# Patient Record
Sex: FEMALE | Race: WHITE | NOT HISPANIC OR LATINO | Employment: UNEMPLOYED | URBAN - NONMETROPOLITAN AREA
[De-identification: names, ages, dates, MRNs, and addresses within clinical notes are randomized per-mention and may not be internally consistent; named-entity substitution may affect disease eponyms.]

---

## 2022-04-19 PROBLEM — M12.9 ARTHROPATHY: Status: ACTIVE | Noted: 2022-04-19

## 2022-04-19 PROBLEM — E03.9 HYPOTHYROIDISM: Status: ACTIVE | Noted: 2022-04-19

## 2022-04-19 PROBLEM — E11.9 TYPE 2 OR UNSPECIFIED TYPE DIABETES MELLITUS: Status: ACTIVE | Noted: 2022-04-19

## 2022-04-19 PROBLEM — E78.2 HYPERLIPIDEMIA, MIXED: Status: ACTIVE | Noted: 2022-04-19

## 2022-04-19 PROBLEM — Z00.00 WELLNESS EXAMINATION: Status: ACTIVE | Noted: 2022-04-19

## 2022-04-19 PROBLEM — E55.9 VITAMIN D DEFICIENCY: Status: ACTIVE | Noted: 2022-04-19

## 2022-04-19 PROBLEM — I10 BENIGN ESSENTIAL HYPERTENSION: Status: ACTIVE | Noted: 2022-04-19

## 2022-04-19 PROBLEM — F33.9 DEPRESSION, MAJOR, RECURRENT: Status: ACTIVE | Noted: 2022-04-19

## 2022-07-25 PROBLEM — Z00.00 WELLNESS EXAMINATION: Status: RESOLVED | Noted: 2022-04-19 | Resolved: 2022-07-25

## 2022-10-27 ENCOUNTER — LAB VISIT (OUTPATIENT)
Dept: LAB | Facility: HOSPITAL | Age: 87
End: 2022-10-27
Attending: INTERNAL MEDICINE
Payer: MEDICARE

## 2022-10-27 DIAGNOSIS — E03.9 HYPOTHYROIDISM, UNSPECIFIED TYPE: ICD-10-CM

## 2022-10-27 DIAGNOSIS — I10 BENIGN ESSENTIAL HYPERTENSION: ICD-10-CM

## 2022-10-27 DIAGNOSIS — E78.2 HYPERLIPIDEMIA, MIXED: ICD-10-CM

## 2022-10-27 DIAGNOSIS — E55.9 VITAMIN D DEFICIENCY: ICD-10-CM

## 2022-10-27 LAB
25(OH)D3+25(OH)D2 SERPL-MCNC: 59 NG/ML (ref 30–96)
ALBUMIN SERPL BCP-MCNC: 3.1 G/DL (ref 3.5–5.2)
ALP SERPL-CCNC: 83 U/L (ref 55–135)
ALT SERPL W/O P-5'-P-CCNC: 20 U/L (ref 10–44)
ANION GAP SERPL CALC-SCNC: 7 MMOL/L (ref 8–16)
AST SERPL-CCNC: 16 U/L (ref 10–40)
BASOPHILS # BLD AUTO: 0.03 K/UL (ref 0–0.2)
BASOPHILS NFR BLD: 0.4 % (ref 0–1.9)
BILIRUB SERPL-MCNC: 0.3 MG/DL (ref 0.1–1)
BUN SERPL-MCNC: 12 MG/DL (ref 8–23)
CALCIUM SERPL-MCNC: 8.5 MG/DL (ref 8.7–10.5)
CHLORIDE SERPL-SCNC: 107 MMOL/L (ref 95–110)
CHOLEST SERPL-MCNC: 124 MG/DL (ref 120–199)
CHOLEST/HDLC SERPL: 3.3 {RATIO} (ref 2–5)
CO2 SERPL-SCNC: 29 MMOL/L (ref 23–29)
CREAT SERPL-MCNC: 0.8 MG/DL (ref 0.5–1.4)
DIFFERENTIAL METHOD: ABNORMAL
EOSINOPHIL # BLD AUTO: 0.1 K/UL (ref 0–0.5)
EOSINOPHIL NFR BLD: 1.3 % (ref 0–8)
ERYTHROCYTE [DISTWIDTH] IN BLOOD BY AUTOMATED COUNT: 14.7 % (ref 11.5–14.5)
EST. GFR  (NO RACE VARIABLE): >60 ML/MIN/1.73 M^2
GLUCOSE SERPL-MCNC: 136 MG/DL (ref 70–110)
HCT VFR BLD AUTO: 41.1 % (ref 37–48.5)
HDLC SERPL-MCNC: 38 MG/DL (ref 40–75)
HDLC SERPL: 30.6 % (ref 20–50)
HGB BLD-MCNC: 13.1 G/DL (ref 12–16)
IMM GRANULOCYTES # BLD AUTO: 0.02 K/UL (ref 0–0.04)
IMM GRANULOCYTES NFR BLD AUTO: 0.3 % (ref 0–0.5)
LDLC SERPL CALC-MCNC: 21.2 MG/DL (ref 63–159)
LYMPHOCYTES # BLD AUTO: 1.2 K/UL (ref 1–4.8)
LYMPHOCYTES NFR BLD: 17.7 % (ref 18–48)
MCH RBC QN AUTO: 30.1 PG (ref 27–31)
MCHC RBC AUTO-ENTMCNC: 31.9 G/DL (ref 32–36)
MCV RBC AUTO: 95 FL (ref 82–98)
MONOCYTES # BLD AUTO: 0.4 K/UL (ref 0.3–1)
MONOCYTES NFR BLD: 6.3 % (ref 4–15)
NEUTROPHILS # BLD AUTO: 5 K/UL (ref 1.8–7.7)
NEUTROPHILS NFR BLD: 74 % (ref 38–73)
NONHDLC SERPL-MCNC: 86 MG/DL
NRBC BLD-RTO: 0 /100 WBC
PLATELET # BLD AUTO: 146 K/UL (ref 150–450)
PMV BLD AUTO: 12 FL (ref 9.2–12.9)
POTASSIUM SERPL-SCNC: 3.6 MMOL/L (ref 3.5–5.1)
PROT SERPL-MCNC: 7 G/DL (ref 6–8.4)
RBC # BLD AUTO: 4.35 M/UL (ref 4–5.4)
SODIUM SERPL-SCNC: 143 MMOL/L (ref 136–145)
TRIGL SERPL-MCNC: 324 MG/DL (ref 30–150)
TSH SERPL DL<=0.005 MIU/L-ACNC: 2.12 UIU/ML (ref 0.4–4)
WBC # BLD AUTO: 6.72 K/UL (ref 3.9–12.7)

## 2022-10-27 PROCEDURE — 36415 COLL VENOUS BLD VENIPUNCTURE: CPT | Performed by: INTERNAL MEDICINE

## 2022-10-27 PROCEDURE — 85025 COMPLETE CBC W/AUTO DIFF WBC: CPT | Performed by: INTERNAL MEDICINE

## 2022-10-27 PROCEDURE — 82306 VITAMIN D 25 HYDROXY: CPT | Performed by: INTERNAL MEDICINE

## 2022-10-27 PROCEDURE — 84443 ASSAY THYROID STIM HORMONE: CPT | Performed by: INTERNAL MEDICINE

## 2022-10-27 PROCEDURE — 80061 LIPID PANEL: CPT | Performed by: INTERNAL MEDICINE

## 2022-10-27 PROCEDURE — 80053 COMPREHEN METABOLIC PANEL: CPT | Performed by: INTERNAL MEDICINE

## 2023-11-11 ENCOUNTER — HOSPITAL ENCOUNTER (INPATIENT)
Facility: HOSPITAL | Age: 88
LOS: 5 days | Discharge: HOSPICE | DRG: 690 | End: 2023-11-16
Attending: EMERGENCY MEDICINE | Admitting: INTERNAL MEDICINE
Payer: MEDICARE

## 2023-11-11 DIAGNOSIS — Z51.5 HOSPICE CARE PATIENT: ICD-10-CM

## 2023-11-11 DIAGNOSIS — N30.00 ACUTE CYSTITIS WITHOUT HEMATURIA: Primary | ICD-10-CM

## 2023-11-11 DIAGNOSIS — F03.90 DEMENTIA: ICD-10-CM

## 2023-11-11 LAB
ALBUMIN SERPL BCP-MCNC: 2.6 G/DL (ref 3.5–5.2)
ALP SERPL-CCNC: 103 U/L (ref 55–135)
ALT SERPL W/O P-5'-P-CCNC: 20 U/L (ref 10–44)
ANION GAP SERPL CALC-SCNC: 1 MMOL/L (ref 3–11)
AST SERPL-CCNC: 18 U/L (ref 10–40)
BACTERIA #/AREA URNS HPF: ABNORMAL /HPF
BASOPHILS # BLD AUTO: 0.04 K/UL (ref 0–0.2)
BASOPHILS NFR BLD: 0.5 % (ref 0–1.9)
BILIRUB SERPL-MCNC: 0.5 MG/DL (ref 0.1–1)
BILIRUB UR QL STRIP: NEGATIVE
BUN SERPL-MCNC: 12 MG/DL (ref 10–30)
CALCIUM SERPL-MCNC: 8.6 MG/DL (ref 8.7–10.5)
CHLORIDE SERPL-SCNC: 104 MMOL/L (ref 95–110)
CLARITY UR: ABNORMAL
CO2 SERPL-SCNC: 32 MMOL/L (ref 23–29)
COLOR UR: YELLOW
CREAT SERPL-MCNC: 0.8 MG/DL (ref 0.5–1.4)
DIFFERENTIAL METHOD: ABNORMAL
EOSINOPHIL # BLD AUTO: 0.1 K/UL (ref 0–0.5)
EOSINOPHIL NFR BLD: 0.9 % (ref 0–8)
ERYTHROCYTE [DISTWIDTH] IN BLOOD BY AUTOMATED COUNT: 14.6 % (ref 11.5–14.5)
EST. GFR  (NO RACE VARIABLE): >60 ML/MIN/1.73 M^2
ESTIMATED AVG GLUCOSE: 91 MG/DL (ref 68–131)
ESTIMATED AVG GLUCOSE: 91 MG/DL (ref 68–131)
FIO2: 28 %
GLUCOSE SERPL-MCNC: 68 MG/DL (ref 70–110)
GLUCOSE UR QL STRIP: NEGATIVE
HBA1C MFR BLD: 4.8 % (ref 4–5.6)
HBA1C MFR BLD: 4.8 % (ref 4–5.6)
HCT VFR BLD AUTO: 33.8 % (ref 37–48.5)
HGB BLD-MCNC: 11 G/DL (ref 12–16)
HGB UR QL STRIP: NEGATIVE
HYALINE CASTS #/AREA URNS LPF: 1 /LPF
IMM GRANULOCYTES # BLD AUTO: 0.03 K/UL (ref 0–0.04)
IMM GRANULOCYTES NFR BLD AUTO: 0.3 % (ref 0–0.5)
INFLUENZA A, MOLECULAR: NEGATIVE
INFLUENZA B, MOLECULAR: NEGATIVE
KETONES UR QL STRIP: ABNORMAL
LACTATE SERPL-SCNC: 0.8 MMOL/L (ref 0.5–2.2)
LEUKOCYTE ESTERASE UR QL STRIP: ABNORMAL
LPM: 2
LYMPHOCYTES # BLD AUTO: 0.7 K/UL (ref 1–4.8)
LYMPHOCYTES NFR BLD: 8.2 % (ref 18–48)
MCH RBC QN AUTO: 30.8 PG (ref 27–31)
MCHC RBC AUTO-ENTMCNC: 32.5 G/DL (ref 32–36)
MCV RBC AUTO: 95 FL (ref 82–98)
MICROSCOPIC COMMENT: ABNORMAL
MODIFIED ALLEN'S TEST: POSITIVE
MONOCYTES # BLD AUTO: 0.7 K/UL (ref 0.3–1)
MONOCYTES NFR BLD: 7.9 % (ref 4–15)
NEUTROPHILS # BLD AUTO: 7.2 K/UL (ref 1.8–7.7)
NEUTROPHILS NFR BLD: 82.2 % (ref 38–73)
NITRITE UR QL STRIP: NEGATIVE
NRBC BLD-RTO: 0 /100 WBC
O2DEVICE: ABNORMAL
PCO2 BLDA: 35.9 MMHG (ref 35–45)
PH SMN: 7.49 [PH] (ref 7.34–7.45)
PH UR STRIP: 7 [PH] (ref 5–8)
PLATELET # BLD AUTO: 219 K/UL (ref 150–450)
PMV BLD AUTO: 10.4 FL (ref 9.2–12.9)
PO2 BLDA: 74.5 MMHG (ref 80–100)
POC BASE DEFICIT: 3.9 MMOL/L (ref -2–2)
POC HCO3: 27.8 MMOL/L (ref 24–28)
POC PERFORMED BY: ABNORMAL
POC SATURATED O2: 97.5 % (ref 95–100)
POC TEMPERATURE: 37 C
POTASSIUM SERPL-SCNC: 3.5 MMOL/L (ref 3.5–5.1)
PROT SERPL-MCNC: 6.8 G/DL (ref 6–8.4)
PROT UR QL STRIP: ABNORMAL
RBC # BLD AUTO: 3.57 M/UL (ref 4–5.4)
RBC #/AREA URNS HPF: 3 /HPF (ref 0–4)
RSV AG SPEC QL IA: NEGATIVE
SARS-COV-2 RNA RESP QL NAA+PROBE: NOT DETECTED
SODIUM SERPL-SCNC: 137 MMOL/L (ref 136–145)
SP GR UR STRIP: 1.02 (ref 1–1.03)
SPECIMEN SOURCE: ABNORMAL
SPECIMEN SOURCE: NORMAL
SPECIMEN SOURCE: NORMAL
SQUAMOUS #/AREA URNS HPF: 1 /HPF
URN SPEC COLLECT METH UR: ABNORMAL
UROBILINOGEN UR STRIP-ACNC: ABNORMAL EU/DL
WBC # BLD AUTO: 8.78 K/UL (ref 3.9–12.7)
WBC #/AREA URNS HPF: >100 /HPF (ref 0–5)

## 2023-11-11 PROCEDURE — 87040 BLOOD CULTURE FOR BACTERIA: CPT | Performed by: EMERGENCY MEDICINE

## 2023-11-11 PROCEDURE — 25000003 PHARM REV CODE 250: Performed by: EMERGENCY MEDICINE

## 2023-11-11 PROCEDURE — 87502 INFLUENZA DNA AMP PROBE: CPT | Performed by: EMERGENCY MEDICINE

## 2023-11-11 PROCEDURE — 36415 COLL VENOUS BLD VENIPUNCTURE: CPT | Performed by: EMERGENCY MEDICINE

## 2023-11-11 PROCEDURE — 83605 ASSAY OF LACTIC ACID: CPT | Performed by: EMERGENCY MEDICINE

## 2023-11-11 PROCEDURE — 93005 ELECTROCARDIOGRAM TRACING: CPT

## 2023-11-11 PROCEDURE — 87077 CULTURE AEROBIC IDENTIFY: CPT | Performed by: EMERGENCY MEDICINE

## 2023-11-11 PROCEDURE — 87086 URINE CULTURE/COLONY COUNT: CPT | Performed by: EMERGENCY MEDICINE

## 2023-11-11 PROCEDURE — 87634 RSV DNA/RNA AMP PROBE: CPT | Performed by: EMERGENCY MEDICINE

## 2023-11-11 PROCEDURE — 87088 URINE BACTERIA CULTURE: CPT | Performed by: EMERGENCY MEDICINE

## 2023-11-11 PROCEDURE — 83036 HEMOGLOBIN GLYCOSYLATED A1C: CPT | Performed by: EMERGENCY MEDICINE

## 2023-11-11 PROCEDURE — 85025 COMPLETE CBC W/AUTO DIFF WBC: CPT | Performed by: EMERGENCY MEDICINE

## 2023-11-11 PROCEDURE — 99285 EMERGENCY DEPT VISIT HI MDM: CPT | Mod: 25

## 2023-11-11 PROCEDURE — 87635 SARS-COV-2 COVID-19 AMP PRB: CPT | Performed by: EMERGENCY MEDICINE

## 2023-11-11 PROCEDURE — 36600 WITHDRAWAL OF ARTERIAL BLOOD: CPT

## 2023-11-11 PROCEDURE — 82803 BLOOD GASES ANY COMBINATION: CPT

## 2023-11-11 PROCEDURE — 80053 COMPREHEN METABOLIC PANEL: CPT | Performed by: EMERGENCY MEDICINE

## 2023-11-11 PROCEDURE — 63600175 PHARM REV CODE 636 W HCPCS: Performed by: EMERGENCY MEDICINE

## 2023-11-11 PROCEDURE — 99900035 HC TECH TIME PER 15 MIN (STAT)

## 2023-11-11 PROCEDURE — 96360 HYDRATION IV INFUSION INIT: CPT

## 2023-11-11 PROCEDURE — 93010 ELECTROCARDIOGRAM REPORT: CPT | Mod: GW,,, | Performed by: INTERNAL MEDICINE

## 2023-11-11 PROCEDURE — 93010 EKG 12-LEAD: ICD-10-PCS | Mod: GW,,, | Performed by: INTERNAL MEDICINE

## 2023-11-11 PROCEDURE — 81000 URINALYSIS NONAUTO W/SCOPE: CPT | Performed by: EMERGENCY MEDICINE

## 2023-11-11 PROCEDURE — 11000001 HC ACUTE MED/SURG PRIVATE ROOM

## 2023-11-11 RX ORDER — LEVOTHYROXINE SODIUM 75 UG/1
75 TABLET ORAL DAILY
Status: DISCONTINUED | OUTPATIENT
Start: 2023-11-12 | End: 2023-11-16 | Stop reason: HOSPADM

## 2023-11-11 RX ORDER — MEMANTINE HYDROCHLORIDE 10 MG/1
10 TABLET ORAL 2 TIMES DAILY
Status: DISCONTINUED | OUTPATIENT
Start: 2023-11-11 | End: 2023-11-16 | Stop reason: HOSPADM

## 2023-11-11 RX ORDER — FAMOTIDINE 10 MG/ML
20 INJECTION INTRAVENOUS DAILY
Status: DISCONTINUED | OUTPATIENT
Start: 2023-11-12 | End: 2023-11-14

## 2023-11-11 RX ORDER — GLUCAGON 1 MG
1 KIT INJECTION
Status: DISCONTINUED | OUTPATIENT
Start: 2023-11-11 | End: 2023-11-16 | Stop reason: HOSPADM

## 2023-11-11 RX ORDER — IBUPROFEN 200 MG
24 TABLET ORAL
Status: DISCONTINUED | OUTPATIENT
Start: 2023-11-11 | End: 2023-11-16 | Stop reason: HOSPADM

## 2023-11-11 RX ORDER — TALC
6 POWDER (GRAM) TOPICAL NIGHTLY PRN
Status: DISCONTINUED | OUTPATIENT
Start: 2023-11-11 | End: 2023-11-16 | Stop reason: HOSPADM

## 2023-11-11 RX ORDER — IBUPROFEN 200 MG
16 TABLET ORAL
Status: DISCONTINUED | OUTPATIENT
Start: 2023-11-11 | End: 2023-11-16 | Stop reason: HOSPADM

## 2023-11-11 RX ORDER — SODIUM CHLORIDE 9 MG/ML
INJECTION, SOLUTION INTRAVENOUS CONTINUOUS
Status: DISCONTINUED | OUTPATIENT
Start: 2023-11-11 | End: 2023-11-13

## 2023-11-11 RX ORDER — INSULIN ASPART 100 [IU]/ML
0-5 INJECTION, SOLUTION INTRAVENOUS; SUBCUTANEOUS
Status: DISCONTINUED | OUTPATIENT
Start: 2023-11-11 | End: 2023-11-16 | Stop reason: HOSPADM

## 2023-11-11 RX ORDER — ACETAMINOPHEN 325 MG/1
650 TABLET ORAL EVERY 8 HOURS PRN
Status: DISCONTINUED | OUTPATIENT
Start: 2023-11-11 | End: 2023-11-16 | Stop reason: HOSPADM

## 2023-11-11 RX ORDER — SODIUM CHLORIDE 0.9 % (FLUSH) 0.9 %
10 SYRINGE (ML) INJECTION
Status: DISCONTINUED | OUTPATIENT
Start: 2023-11-11 | End: 2023-11-16 | Stop reason: HOSPADM

## 2023-11-11 RX ORDER — ONDANSETRON 2 MG/ML
4 INJECTION INTRAMUSCULAR; INTRAVENOUS EVERY 8 HOURS PRN
Status: DISCONTINUED | OUTPATIENT
Start: 2023-11-11 | End: 2023-11-16 | Stop reason: HOSPADM

## 2023-11-11 RX ADMIN — CEFTRIAXONE 2 G: 2 INJECTION, POWDER, FOR SOLUTION INTRAMUSCULAR; INTRAVENOUS at 03:11

## 2023-11-11 RX ADMIN — SODIUM CHLORIDE 1000 ML: 9 INJECTION, SOLUTION INTRAVENOUS at 01:11

## 2023-11-11 RX ADMIN — SODIUM CHLORIDE: 9 INJECTION, SOLUTION INTRAVENOUS at 05:11

## 2023-11-11 NOTE — ED NOTES
ROBIN Montes talked with ROBIN Lees from hospice. ROBIN Lees was unsure of patients normal state. Informed Yoana of pt currently state and vitals.

## 2023-11-11 NOTE — ED PROVIDER NOTES
"Encounter Date: 11/11/2023       History     Chief Complaint   Patient presents with    Fatigue     EMS reports, "Hospice patient for dementia. The family had some difficulty waking her up today; they said she wasn't waking up like normal. Takes Seroquel at night. They want her evaluated for a stroke. CBG 72. Patient is able to answer questions."     89-year-old female with a history of dementia, on hospice, diabetes, hypertension presents the emergency room after the family tried to wake her up and she was more drowsy than normal.  She does take Seroquel.  No focal deficits, patient just appears tired.  No trauma.      Review of patient's allergies indicates:   Allergen Reactions    Sulfamethoxazole-trimethoprim Hives     Other reaction(s): hives     Past Medical History:   Diagnosis Date    Anemia, unspecified     Anxiety disorder, unspecified     Asthma     Carpal tunnel syndrome     Chills     Constipation     Dementia     Depression     Dermatosis     Diabetes mellitus out of control     Diabetes mellitus type 2, controlled     Diarrhea     Dizziness     Gastroenteritis     Gastroesophageal reflux     GI hemorrhage     Heartburn     HTN (hypertension)     Hypothyroidism, unspecified     Impaction of the bowels     Irritable bowel syndrome     Major depressive disorder, single episode, unspecified     Malaise and fatigue     Melena     Mild intermittent asthma, uncomplicated     Mixed hyperlipidemia     Nausea with vomiting     Osteopenia     Rheumatoid arthritis, unspecified     Rosacea     Sleep apnea, unspecified     Unspecified macular degeneration     Unspecified osteoarthritis, unspecified site     Vitamin D deficiency     Weakness      Past Surgical History:   Procedure Laterality Date    CARPAL TUNNEL RELEASE  2006    lt. hand    CATARACT EXTRACTION  2006    CHOLECYSTECTOMY      COLONOSCOPY  1997    COSMETIC SURGERY      FACE LIFT    HEMORRHOID SURGERY      RHINOPLASTY       Family History   Problem " Relation Age of Onset    Coronary artery disease Father     Coronary artery disease Brother     Diabetes Mellitus Brother     Diabetes Mellitus Paternal Grandmother      Social History     Tobacco Use    Smoking status: Former     Current packs/day: 1.00     Types: Cigarettes    Smokeless tobacco: Never    Tobacco comments:     started age 18 stopped age 55   Substance Use Topics    Alcohol use: Not Currently    Drug use: Never     Review of Systems   HENT:  Negative for congestion.    Eyes:  Negative for discharge.   Respiratory:  Negative for cough.    Cardiovascular:  Negative for chest pain and leg swelling.   Gastrointestinal:  Negative for abdominal distention.   All other systems reviewed and are negative.      Physical Exam     Initial Vitals [11/11/23 1315]   BP Pulse Resp Temp SpO2   (!) 149/65 87 18 99.4 °F (37.4 °C) 95 %      MAP       --         Physical Exam    Nursing note and vitals reviewed.  Constitutional: She appears well-developed and well-nourished. She is not diaphoretic. No distress.   HENT:   Head: Normocephalic and atraumatic.   Nose: Nose normal.   Mouth/Throat: Oropharynx is clear and moist.   Eyes: Conjunctivae and EOM are normal. Pupils are equal, round, and reactive to light.   Neck: Neck supple.   Normal range of motion.  Cardiovascular:  Normal rate, regular rhythm, normal heart sounds and intact distal pulses.           Pulmonary/Chest: Breath sounds normal. No respiratory distress. She has no wheezes. She has no rhonchi. She has no rales.   Abdominal: Abdomen is soft. Bowel sounds are normal. She exhibits no distension. There is no abdominal tenderness. There is no rebound and no guarding.   Musculoskeletal:         General: No edema. Normal range of motion.      Cervical back: Normal range of motion and neck supple.     Neurological: She is alert.   Patient does answer questions, follows some commands.  She appears drowsy, no focal deficits noted, no facial droop   Skin: Skin is  warm and dry. Capillary refill takes less than 2 seconds.         ED Course   Procedures  Labs Reviewed   CBC W/ AUTO DIFFERENTIAL - Abnormal; Notable for the following components:       Result Value    RBC 3.57 (*)     Hemoglobin 11.0 (*)     Hematocrit 33.8 (*)     RDW 14.6 (*)     Lymph # 0.7 (*)     Gran % 82.2 (*)     Lymph % 8.2 (*)     All other components within normal limits   COMPREHENSIVE METABOLIC PANEL - Abnormal; Notable for the following components:    CO2 32 (*)     Glucose 68 (*)     Calcium 8.6 (*)     Albumin 2.6 (*)     Anion Gap 1 (*)     All other components within normal limits   URINALYSIS, REFLEX TO URINE CULTURE - Abnormal; Notable for the following components:    Appearance, UA Cloudy (*)     Protein, UA 1+ (*)     Ketones, UA 1+ (*)     Urobilinogen, UA 2.0-3.0 (*)     Leukocytes, UA 2+ (*)     All other components within normal limits    Narrative:     Preferred Collection Type->Urine, Clean Catch  Specimen Source->Urine   URINALYSIS MICROSCOPIC - Abnormal; Notable for the following components:    WBC, UA >100 (*)     Bacteria Few (*)     All other components within normal limits    Narrative:     Preferred Collection Type->Urine, Clean Catch  Specimen Source->Urine   INFLUENZA A & B BY MOLECULAR   CULTURE, BLOOD   CULTURE, BLOOD   CULTURE, URINE   LACTIC ACID, PLASMA   RSV ANTIGEN DETECTION   SARS-COV-2 (COVID-19) QUALITATIVE PCR     EKG Readings: (Independently Interpreted)   Initial Reading: No STEMI. Rhythm: Normal Sinus Rhythm. Heart Rate: 88. Ectopy: No Ectopy. Conduction: Normal. ST Segments: Normal ST Segments. T Waves: Normal. Axis: Normal. Clinical Impression: Normal Sinus Rhythm       Imaging Results              X-Ray Chest 1 View (Final result)  Result time 11/11/23 13:45:51      Final result by Prisca Bradshaw MD (11/11/23 13:45:51)                   Impression:      Diffuse chronic interstitial changes.      Electronically signed by: Prisca Bradshaw  MD  Date:    11/11/2023  Time:    13:45               Narrative:    EXAMINATION:  XR CHEST 1 VIEW    CLINICAL HISTORY:  Unspecified dementia, unspecified severity, without behavioral disturbance, psychotic disturbance, mood disturbance, and anxiety    TECHNIQUE:  portable chest x-ray    COMPARISON:  03/28/2015.  <Comparisons>    FINDINGS:  Chronic changes.  No focal infiltrates or effusions                                       Medications   cefTRIAXone (ROCEPHIN) 2 g in dextrose 5 % in water (D5W) 100 mL IVPB (MB+) (has no administration in time range)   sodium chloride 0.9% bolus 1,000 mL 1,000 mL (1,000 mLs Intravenous New Bag 11/11/23 1348)     Medical Decision Making  Amount and/or Complexity of Data Reviewed  Labs: ordered.  Radiology: ordered.               ED Course as of 11/11/23 1509   Sat Nov 11, 2023   1336 Chest x-ray is negative for acute changes [SD]      ED Course User Index  [SD] Vini Kerr MD               Medical Decision Making:   Differential Diagnosis:   Progressive dementia, affects of Seroquel, UTI, viral syndrome  ED Management:  Discussed case with  - will admit for IV fluids and IV antibiotics, neuro checks.    No clinical signs of sepsis, blood pressure is good, not tachycardic, white blood cell count is normal.  Stable for admission to telemetry floor      Clinical Impression:   Final diagnoses:  [F03.90] Dementia  [N30.00] Acute cystitis without hematuria (Primary)        ED Disposition Condition    Admit Stable                Vini Kerr MD  11/11/23 3486

## 2023-11-12 PROBLEM — N30.00 ACUTE CYSTITIS WITHOUT HEMATURIA: Status: ACTIVE | Noted: 2023-11-12

## 2023-11-12 LAB
ALBUMIN SERPL BCP-MCNC: 2.2 G/DL (ref 3.5–5.2)
ALP SERPL-CCNC: 93 U/L (ref 55–135)
ALT SERPL W/O P-5'-P-CCNC: 17 U/L (ref 10–44)
ANION GAP SERPL CALC-SCNC: 7 MMOL/L (ref 3–11)
AST SERPL-CCNC: 34 U/L (ref 10–40)
BASOPHILS # BLD AUTO: 0.02 K/UL (ref 0–0.2)
BASOPHILS NFR BLD: 0.2 % (ref 0–1.9)
BILIRUB SERPL-MCNC: 0.3 MG/DL (ref 0.1–1)
BUN SERPL-MCNC: 13 MG/DL (ref 10–30)
CALCIUM SERPL-MCNC: 7.9 MG/DL (ref 8.7–10.5)
CHLORIDE SERPL-SCNC: 108 MMOL/L (ref 95–110)
CO2 SERPL-SCNC: 23 MMOL/L (ref 23–29)
CREAT SERPL-MCNC: 0.8 MG/DL (ref 0.5–1.4)
DIFFERENTIAL METHOD: ABNORMAL
EOSINOPHIL # BLD AUTO: 0 K/UL (ref 0–0.5)
EOSINOPHIL NFR BLD: 0 % (ref 0–8)
ERYTHROCYTE [DISTWIDTH] IN BLOOD BY AUTOMATED COUNT: 14.6 % (ref 11.5–14.5)
EST. GFR  (NO RACE VARIABLE): >60 ML/MIN/1.73 M^2
GLUCOSE SERPL-MCNC: 153 MG/DL (ref 70–110)
HCT VFR BLD AUTO: 31.3 % (ref 37–48.5)
HGB BLD-MCNC: 10.4 G/DL (ref 12–16)
IMM GRANULOCYTES # BLD AUTO: 0.05 K/UL (ref 0–0.04)
IMM GRANULOCYTES NFR BLD AUTO: 0.4 % (ref 0–0.5)
LYMPHOCYTES # BLD AUTO: 0.8 K/UL (ref 1–4.8)
LYMPHOCYTES NFR BLD: 6.9 % (ref 18–48)
MCH RBC QN AUTO: 30.9 PG (ref 27–31)
MCHC RBC AUTO-ENTMCNC: 33.2 G/DL (ref 32–36)
MCV RBC AUTO: 93 FL (ref 82–98)
MONOCYTES # BLD AUTO: 0.7 K/UL (ref 0.3–1)
MONOCYTES NFR BLD: 5.8 % (ref 4–15)
NEUTROPHILS # BLD AUTO: 9.8 K/UL (ref 1.8–7.7)
NEUTROPHILS NFR BLD: 86.7 % (ref 38–73)
NRBC BLD-RTO: 0 /100 WBC
PLATELET # BLD AUTO: 196 K/UL (ref 150–450)
PMV BLD AUTO: 10.6 FL (ref 9.2–12.9)
POCT GLUCOSE: 121 MG/DL (ref 70–110)
POCT GLUCOSE: 164 MG/DL (ref 70–110)
POTASSIUM SERPL-SCNC: 3.6 MMOL/L (ref 3.5–5.1)
PROT SERPL-MCNC: 6.3 G/DL (ref 6–8.4)
RBC # BLD AUTO: 3.37 M/UL (ref 4–5.4)
SODIUM SERPL-SCNC: 138 MMOL/L (ref 136–145)
WBC # BLD AUTO: 11.25 K/UL (ref 3.9–12.7)

## 2023-11-12 PROCEDURE — 63600175 PHARM REV CODE 636 W HCPCS: Performed by: INTERNAL MEDICINE

## 2023-11-12 PROCEDURE — 80053 COMPREHEN METABOLIC PANEL: CPT | Performed by: EMERGENCY MEDICINE

## 2023-11-12 PROCEDURE — 36415 COLL VENOUS BLD VENIPUNCTURE: CPT | Performed by: EMERGENCY MEDICINE

## 2023-11-12 PROCEDURE — 25000003 PHARM REV CODE 250: Performed by: EMERGENCY MEDICINE

## 2023-11-12 PROCEDURE — 25000003 PHARM REV CODE 250: Performed by: INTERNAL MEDICINE

## 2023-11-12 PROCEDURE — 11000001 HC ACUTE MED/SURG PRIVATE ROOM

## 2023-11-12 PROCEDURE — 85025 COMPLETE CBC W/AUTO DIFF WBC: CPT | Performed by: EMERGENCY MEDICINE

## 2023-11-12 RX ORDER — ATORVASTATIN CALCIUM 20 MG/1
20 TABLET, FILM COATED ORAL DAILY
Status: DISCONTINUED | OUTPATIENT
Start: 2023-11-12 | End: 2023-11-16 | Stop reason: HOSPADM

## 2023-11-12 RX ORDER — VENLAFAXINE HYDROCHLORIDE 75 MG/1
150 CAPSULE, EXTENDED RELEASE ORAL DAILY
Status: DISCONTINUED | OUTPATIENT
Start: 2023-11-12 | End: 2023-11-16 | Stop reason: HOSPADM

## 2023-11-12 RX ORDER — IBUPROFEN 200 MG
16 TABLET ORAL
Status: DISCONTINUED | OUTPATIENT
Start: 2023-11-12 | End: 2023-11-16 | Stop reason: HOSPADM

## 2023-11-12 RX ORDER — IBUPROFEN 200 MG
24 TABLET ORAL
Status: DISCONTINUED | OUTPATIENT
Start: 2023-11-12 | End: 2023-11-16 | Stop reason: HOSPADM

## 2023-11-12 RX ORDER — QUETIAPINE FUMARATE 100 MG/1
100 TABLET, FILM COATED ORAL NIGHTLY
Status: DISCONTINUED | OUTPATIENT
Start: 2023-11-12 | End: 2023-11-16 | Stop reason: HOSPADM

## 2023-11-12 RX ORDER — GLUCAGON 1 MG
1 KIT INJECTION
Status: DISCONTINUED | OUTPATIENT
Start: 2023-11-12 | End: 2023-11-16 | Stop reason: HOSPADM

## 2023-11-12 RX ADMIN — FAMOTIDINE 20 MG: 10 INJECTION, SOLUTION INTRAVENOUS at 09:11

## 2023-11-12 RX ADMIN — CEFTRIAXONE 2 G: 2 INJECTION, POWDER, FOR SOLUTION INTRAMUSCULAR; INTRAVENOUS at 02:11

## 2023-11-12 RX ADMIN — SODIUM CHLORIDE: 9 INJECTION, SOLUTION INTRAVENOUS at 12:11

## 2023-11-12 RX ADMIN — SODIUM CHLORIDE: 9 INJECTION, SOLUTION INTRAVENOUS at 08:11

## 2023-11-12 NOTE — PLAN OF CARE
11/12/23 1056   Medicare Message   Important Message from Medicare regarding Discharge Appeal Rights Other (comments)  (Attempted to call son Shahbaz as patient is not able to sign. No response. Copy left in patient's room.)

## 2023-11-12 NOTE — ED NOTES
Sepsis post fluid bolus tissue perfusion exam:    See current vital signs.    Cardiopulmonary:   Heart: Regular rate and rhythm   Lungs: Clear to auscultation bilaterally    Capillary refill: < 3 seconds  Peripheral pulses: radial 2+ bilaterally  Skin: warm/dry/pink with good turgor

## 2023-11-12 NOTE — ASSESSMENT & PLAN NOTE
Patient's FSGs are controlled on current medication regimen.  Last A1c reviewed-   Lab Results   Component Value Date    HGBA1C 4.8 11/11/2023    HGBA1C 4.8 11/11/2023     Most recent fingerstick glucose reviewed-   Recent Labs   Lab 11/12/23  0619   POCTGLUCOSE 164*     Current correctional scale  Low  Maintain anti-hyperglycemic dose as follows-   Antihyperglycemics (From admission, onward)    Start     Stop Route Frequency Ordered    11/11/23 1624  insulin aspart U-100 pen 0-5 Units         -- SubQ Before meals & nightly PRN 11/11/23 1611        Hold Oral hypoglycemics while patient is in the hospital.

## 2023-11-12 NOTE — PLAN OF CARE
11/12/23 1101   Medicare Message   Important Message from Medicare regarding Discharge Appeal Rights Given to patient/caregiver;Explained to patient/caregiver   Date IMM was signed 11/12/23   Time IMM was signed 1100       IMM discussed w/patient's son Shahbaz via phone. Verbalized understanding and copy left in patient's room.

## 2023-11-12 NOTE — ASSESSMENT & PLAN NOTE
Chronic, controlled. Latest blood pressure and vitals reviewed-     Temp:  [96.8 °F (36 °C)-99.6 °F (37.6 °C)]   Pulse:  [77-93]   Resp:  [17-36]   BP: (122-149)/(58-66)   SpO2:  [93 %-100 %] .   Home meds for hypertension were reviewed and noted below.   Hypertension Medications             valsartan-hydrochlorothiazide (DIOVAN-HCT) 80-12.5 mg per tablet Take 1 tablet by mouth once daily          While in the hospital, will manage blood pressure as follows; Continue home antihypertensive regimen    Will utilize p.r.n. blood pressure medication only if patient's blood pressure greater than 160/100 and she develops symptoms such as worsening chest pain or shortness of breath.

## 2023-11-12 NOTE — SUBJECTIVE & OBJECTIVE
Past Medical History:   Diagnosis Date    Anemia, unspecified     Anxiety disorder, unspecified     Asthma     Carpal tunnel syndrome     Chills     Constipation     Dementia     Depression     Dermatosis     Diabetes mellitus out of control     Diabetes mellitus type 2, controlled     Diarrhea     Dizziness     Gastroenteritis     Gastroesophageal reflux     GI hemorrhage     Heartburn     HTN (hypertension)     Hypothyroidism, unspecified     Impaction of the bowels     Irritable bowel syndrome     Major depressive disorder, single episode, unspecified     Malaise and fatigue     Melena     Mild intermittent asthma, uncomplicated     Mixed hyperlipidemia     Nausea with vomiting     Osteopenia     Rheumatoid arthritis, unspecified     Rosacea     Sleep apnea, unspecified     Unspecified macular degeneration     Unspecified osteoarthritis, unspecified site     Vitamin D deficiency     Weakness        Past Surgical History:   Procedure Laterality Date    CARPAL TUNNEL RELEASE  2006    lt. hand    CATARACT EXTRACTION  2006    CHOLECYSTECTOMY      COLONOSCOPY  1997    COSMETIC SURGERY      FACE LIFT    HEMORRHOID SURGERY      RHINOPLASTY         Review of patient's allergies indicates:   Allergen Reactions    Sulfamethoxazole-trimethoprim Hives     Other reaction(s): hives       No current facility-administered medications on file prior to encounter.     Current Outpatient Medications on File Prior to Encounter   Medication Sig    cholecalciferol, vitamin D3, (VITAMIN D3) 50 mcg (2,000 unit) Cap capsule 1 capsule.    esomeprazole (NEXIUM) 40 MG capsule TAKE 1 CAPSULE BY MOUTH EVERY DAY BEFORE BREAKFAST.    LANTUS SOLOSTAR U-100 INSULIN glargine 100 units/mL SubQ pen INJECT 60 UNITS SUBCUTANEOUSLY ONCE DAILY    levothyroxine (SYNTHROID) 75 MCG tablet Take 1 tablet by mouth once daily    meclizine (ANTIVERT) 25 mg tablet TAKE 1 TABLET BY MOUTH THREE TIMES DAILY AS NEEDED FOR DIZZINESS    memantine (NAMENDA) 10 MG Tab  "Take 1 tablet by mouth twice daily    metoclopramide HCl (REGLAN) 5 MG tablet TAKE 1 TABLET BY MOUTH 4 TIMES DAILY 30 MINUTES BEFORE  MEALS  AND  AT  BEDTIME    multivit-min/iron/folic/lutein (CENTRUM SILVER WOMEN ORAL) Take by mouth.    pioglitazone (ACTOS) 30 MG tablet Take 1 tablet by mouth once daily    QUEtiapine (SEROQUEL) 100 MG Tab TAKE 1 TABLET BY MOUTH ONCE DAILY AT BEDTIME    simvastatin (ZOCOR) 40 MG tablet TAKE 1 TABLET BY MOUTH ONCE DAILY IN THE EVENING    valsartan-hydrochlorothiazide (DIOVAN-HCT) 80-12.5 mg per tablet Take 1 tablet by mouth once daily    venlafaxine (EFFEXOR-XR) 150 MG Cp24 TAKE 1 CAPSULE BY MOUTH EVERY DAY    vit A/vit C/vit E/zinc/copper (PRESERVISION AREDS ORAL) Take by mouth.    vitamin E 400 UNIT capsule 1 capsule.    BINAXNOW COVID-19 AG SELF TEST Kit Use as Directed on the Package    iron vkkioz-L-B87-Ca-suc-stoma (MULTIGEN) 70 mg-150 mg-10 mcg-2 mg-75 mg Tab tablet TAKE 1 TABLET BY MOUTH EVERY DAY    pen needle, diabetic 32 gauge x 5/32" Ndle 1 applicator by Misc.(Non-Drug; Combo Route) route once daily.     Family History       Problem Relation (Age of Onset)    Coronary artery disease Father, Brother    Diabetes Mellitus Brother, Paternal Grandmother          Tobacco Use    Smoking status: Former     Current packs/day: 1.00     Types: Cigarettes    Smokeless tobacco: Never    Tobacco comments:     started age 18 stopped age 55   Substance and Sexual Activity    Alcohol use: Not Currently    Drug use: Never    Sexual activity: Not on file     Review of Systems   Unable to perform ROS: Dementia     Objective:     Vital Signs (Most Recent):  Temp: 97 °F (36.1 °C) (11/12/23 0430)  Pulse: 93 (11/12/23 0430)  Resp: (!) 22 (11/12/23 0430)  BP: (!) 122/59 (11/12/23 0430)  SpO2: (!) 93 % (11/12/23 0430) Vital Signs (24h Range):  Temp:  [96.8 °F (36 °C)-99.6 °F (37.6 °C)] 97 °F (36.1 °C)  Pulse:  [77-93] 93  Resp:  [17-36] 22  SpO2:  [93 %-97 %] 93 %  BP: (122-149)/(58-66) 122/59 " "    Weight: 73.9 kg (162 lb 14.4 oz)  Body mass index is 30.78 kg/m².     Physical Exam  Vitals and nursing note reviewed.   Constitutional:       General: She is not in acute distress.     Appearance: She is well-developed. She is ill-appearing. She is not diaphoretic.   HENT:      Head: Normocephalic and atraumatic.      Nose: No congestion or rhinorrhea.      Mouth/Throat:      Mouth: Mucous membranes are moist.      Pharynx: Oropharynx is clear. No oropharyngeal exudate or posterior oropharyngeal erythema.   Eyes:      General: No scleral icterus.        Right eye: No discharge.         Left eye: No discharge.      Extraocular Movements: Extraocular movements intact.   Cardiovascular:      Rate and Rhythm: Normal rate and regular rhythm.   Pulmonary:      Effort: Pulmonary effort is normal. No respiratory distress.      Breath sounds: No wheezing or rales.   Abdominal:      General: Bowel sounds are normal. There is no distension.      Palpations: Abdomen is soft.   Musculoskeletal:      Cervical back: Normal range of motion and neck supple.   Skin:     General: Skin is warm and dry.      Capillary Refill: Capillary refill takes less than 2 seconds.   Neurological:      Mental Status: She is alert.      Motor: Weakness (generalized) present.      Comments: Will answer questions, minimal responses   Psychiatric:         Mood and Affect: Mood normal.         Behavior: Behavior normal.         Thought Content: Thought content normal.                Significant Labs: All pertinent labs within the past 24 hours have been reviewed.  Urine Culture: No results for input(s): "LABURIN" in the last 48 hours.  Urine Studies:   Recent Labs   Lab 11/11/23  1347   COLORU Yellow   APPEARANCEUA Cloudy*   PHUR 7.0   SPECGRAV 1.020   PROTEINUA 1+*   GLUCUA Negative   KETONESU 1+*   BILIRUBINUA Negative   OCCULTUA Negative   NITRITE Negative   UROBILINOGEN 2.0-3.0*   LEUKOCYTESUR 2+*   RBCUA 3   WBCUA >100*   BACTERIA Few* "   SQUAMEPITHEL 1   HYALINECASTS 1     Recent Lab Results  (Last 5 results in the past 24 hours)        11/12/23  0516   11/11/23  1347   11/11/23  1341   11/11/23  1339   11/11/23  1338        RSV Ag by Molecular Method         Negative       Influenza A, Molecular         Negative       Influenza B, Molecular         Negative       Base Deficit       3.9  Comment: Value above reference range         Performed By:       Sapna         Specimen source       Arterial         Appearance, UA   Cloudy             Bacteria, UA   Few             Baso # 0.02               Basophil % 0.2               Bilirubin (UA)   Negative             Blood Culture, Routine     No Growth to date  [P]           Color, UA   Yellow             Differential Method Automated               Eos # 0.0               Eosinophil % 0.0               FiO2       28.0         Flu A & B Source         Nasal Swab       Glucose, UA   Negative             Gran # (ANC) 9.8               Gran % 86.7               Hematocrit 31.3               Hemoglobin 10.4               Hyaline Casts, UA   1             Immature Grans (Abs) 0.05  Comment: Mild elevation in immature granulocytes is non specific and   can be seen in a variety of conditions including stress response,   acute inflammation, trauma and pregnancy. Correlation with other   laboratory and clinical findings is essential.                 Immature Granulocytes 0.4               Ketones, UA   1+             Leukocytes, UA   2+             LPM       2.0         Lymph # 0.8               Lymph % 6.9               MCH 30.9               MCHC 33.2               MCV 93               Microscopic Comment   SEE COMMENT  Comment: Other formed elements not mentioned in the report are not   present in the microscopic examination.                MODIFIED JAMIE'S TEST       POSITIVE         Mono # 0.7               Mono % 5.8               MPV 10.6               NITRITE UA   Negative             nRBC 0                O2DEVICE       Nasal Cannula         Occult Blood UA   Negative             pH, UA   7.0             Platelet Count 196               POC HCO3       27.8         POC PCO2       35.9         POC PH       7.489  Comment: Value above reference range         POC PO2       74.5  Comment: Value below reference range         POC SATURATED O2       97.5         POC Temp       37.0         Protein, UA   1+  Comment: Recommend a 24 hour urine protein or a urine   protein/creatinine ratio if globulin induced proteinuria is  clinically suspected.               RBC 3.37               RBC, UA   3             RDW 14.6               RSV Source         NP       SARS-CoV2 (COVID-19) Qualitative PCR         Not Detected  Comment: This test utilizes a real-time reverse transcription  polymerase chain reaction procedure to amplify and   detect the SARS-CoV-2 and detect the SARS-CoV-2 N2 and E nucleic  acid targets. The analytical sensitivity (limit of detection) of   this assay is 250 copies/mL.    A Detected result implies that the patient is infected with the  SARS-CoV-2 virus and is presumed to be contagious.    A Not Detected result implies that the SARS-CoV-2 target nucleic  acids are not present above the limit of detection.  It does not  rule out the possibility of COVID-19 and should not be the sole  basis for treatment decisions. If COVID-19 is strongly suspected  based on clinical and epidemiological history, re-testing should  be considered.    This test is only for use under Food and Drug   Administration s Emergency Use Authorization (EUA).   Commercial reagents are provided by [employer].  Performance characteristics of the EUA have been   independently verified by [facility]   Department of Pathology and Laboratory Medicine.           Specific Gravity, UA   1.020             Specimen UA   Urine, Catheterized             Squam Epithel, UA   1             UROBILINOGEN UA   2.0-3.0             WBC, UA   >100              WBC 11.25                                       [P] - Preliminary Result               Significant Imaging: I have reviewed all pertinent imaging results/findings within the past 24 hours.

## 2023-11-12 NOTE — ASSESSMENT & PLAN NOTE
Patient on hospice services, was brought in to the ED with suspicion of stroke symptoms, was found to have UTI, patient was admitted for IV antibiotic therapy, no leukocytosis, afebrile currently, follow-up urine culture report, continue IV Rocephin

## 2023-11-12 NOTE — H&P
"[facility] Medicine  History & Physical    Patient Name: Nan Holman  MRN: [id number]  Patient Class: IP- Inpatient  Admission Date: 11/11/2023  Attending Physician: Joie Reid MD   Primary Care Provider: Joie Reid MD         Patient information was obtained from patient, past medical records and ER records.     Subjective:     Principal Problem:Acute cystitis without hematuria    Chief Complaint:   Chief Complaint   Patient presents with    Fatigue     EMS reports, "Hospice patient for dementia. The family had some difficulty waking her up today; they said she wasn't waking up like normal. Takes Seroquel at night. They want her evaluated for a stroke. CBG 72. Patient is able to answer questions."        HPI: Patient 89-year-old female history of GERD, type 2 diabetes, hypothyroidism, dementia, hyperlipidemia patient ED from hospice care after not being arousable, ED physician talked to PCP, patient was found to have UTI, patient was admitted for IV antibiotics, plan to discharge back to hospice once stable      Past Medical History:   Diagnosis Date    Anemia, unspecified     Anxiety disorder, unspecified     Asthma     Carpal tunnel syndrome     Chills     Constipation     Dementia     Depression     Dermatosis     Diabetes mellitus out of control     Diabetes mellitus type 2, controlled     Diarrhea     Dizziness     Gastroenteritis     Gastroesophageal reflux     GI hemorrhage     Heartburn     HTN (hypertension)     Hypothyroidism, unspecified     Impaction of the bowels     Irritable bowel syndrome     Major depressive disorder, single episode, unspecified     Malaise and fatigue     Melena     Mild intermittent asthma, uncomplicated     Mixed hyperlipidemia     Nausea with vomiting     Osteopenia     Rheumatoid arthritis, unspecified     Rosacea     Sleep apnea, unspecified     Unspecified macular degeneration     Unspecified " osteoarthritis, unspecified site     Vitamin D deficiency     Weakness        Past Surgical History:   Procedure Laterality Date    CARPAL TUNNEL RELEASE  2006    lt. hand    CATARACT EXTRACTION  2006    CHOLECYSTECTOMY      COLONOSCOPY  1997    COSMETIC SURGERY      FACE LIFT    HEMORRHOID SURGERY      RHINOPLASTY         Review of patient's allergies indicates:   Allergen Reactions    Sulfamethoxazole-trimethoprim Hives     Other reaction(s): hives       No current facility-administered medications on file prior to encounter.     Current Outpatient Medications on File Prior to Encounter   Medication Sig    cholecalciferol, vitamin D3, (VITAMIN D3) 50 mcg (2,000 unit) Cap capsule 1 capsule.    esomeprazole (NEXIUM) 40 MG capsule TAKE 1 CAPSULE BY MOUTH EVERY DAY BEFORE BREAKFAST.    LANTUS SOLOSTAR U-100 INSULIN glargine 100 units/mL SubQ pen INJECT 60 UNITS SUBCUTANEOUSLY ONCE DAILY    levothyroxine (SYNTHROID) 75 MCG tablet Take 1 tablet by mouth once daily    meclizine (ANTIVERT) 25 mg tablet TAKE 1 TABLET BY MOUTH THREE TIMES DAILY AS NEEDED FOR DIZZINESS    memantine (NAMENDA) 10 MG Tab Take 1 tablet by mouth twice daily    metoclopramide HCl (REGLAN) 5 MG tablet TAKE 1 TABLET BY MOUTH 4 TIMES DAILY 30 MINUTES BEFORE  MEALS  AND  AT  BEDTIME    multivit-min/iron/folic/lutein (CENTRUM SILVER WOMEN ORAL) Take by mouth.    pioglitazone (ACTOS) 30 MG tablet Take 1 tablet by mouth once daily    QUEtiapine (SEROQUEL) 100 MG Tab TAKE 1 TABLET BY MOUTH ONCE DAILY AT BEDTIME    simvastatin (ZOCOR) 40 MG tablet TAKE 1 TABLET BY MOUTH ONCE DAILY IN THE EVENING    valsartan-hydrochlorothiazide (DIOVAN-HCT) 80-12.5 mg per tablet Take 1 tablet by mouth once daily    venlafaxine (EFFEXOR-XR) 150 MG Cp24 TAKE 1 CAPSULE BY MOUTH EVERY DAY    vit A/vit C/vit E/zinc/copper (PRESERVISION AREDS ORAL) Take by mouth.    vitamin E 400 UNIT capsule 1 capsule.    BINAXNOW COVID-19 AG SELF TEST Kit Use as  "Directed on the Package    iron [id number]-Ca-suc-stoma (MULTIGEN) 70 mg-150 mg-10 mcg-2 mg-75 mg Tab tablet TAKE 1 TABLET BY MOUTH EVERY DAY    pen needle, diabetic 32 gauge x 5/32" Ndle 1 applicator by Misc.(Non-Drug; Combo Route) route once daily.     Family History       Problem Relation (Age of Onset)    Coronary artery disease Father, Brother    Diabetes Mellitus Brother, Paternal Grandmother          Tobacco Use    Smoking status: Former     Current packs/day: 1.00     Types: Cigarettes    Smokeless tobacco: Never    Tobacco comments:     started age 18 stopped age 55   Substance and Sexual Activity    Alcohol use: Not Currently    Drug use: Never    Sexual activity: Not on file     Review of Systems   Unable to perform ROS: Dementia     Objective:     Vital Signs (Most Recent):  Temp: 97 °F (36.1 °C) (11/12/23 0430)  Pulse: 93 (11/12/23 0430)  Resp: (!) 22 (11/12/23 0430)  BP: (!) 122/59 (11/12/23 0430)  SpO2: (!) 93 % (11/12/23 0430) Vital Signs (24h Range):  Temp:  [96.8 °F (36 °C)-99.6 °F (37.6 °C)] 97 °F (36.1 °C)  Pulse:  [77-93] 93  Resp:  [17-36] 22  SpO2:  [93 %-97 %] 93 %  BP: (122-149)/(58-66) 122/59     Weight: 73.9 kg (162 lb 14.4 oz)  Body mass index is 30.78 kg/m².     Physical Exam  Vitals and nursing note reviewed.   Constitutional:       General: She is not in acute distress.     Appearance: She is well-developed. She is ill-appearing. She is not diaphoretic.   HENT:      Head: Normocephalic and atraumatic.      Nose: No congestion or rhinorrhea.      Mouth/Throat:      Mouth: Mucous membranes are moist.      Pharynx: Oropharynx is clear. No oropharyngeal exudate or posterior oropharyngeal erythema.   Eyes:      General: No scleral icterus.        Right eye: No discharge.         Left eye: No discharge.      Extraocular Movements: Extraocular movements intact.   Cardiovascular:      Rate and Rhythm: Normal rate and regular rhythm.   Pulmonary:      Effort: Pulmonary effort is " "normal. No respiratory distress.      Breath sounds: No wheezing or rales.   Abdominal:      General: Bowel sounds are normal. There is no distension.      Palpations: Abdomen is soft.   Musculoskeletal:      Cervical back: Normal range of motion and neck supple.   Skin:     General: Skin is warm and dry.      Capillary Refill: Capillary refill takes less than 2 seconds.   Neurological:      Mental Status: She is alert.      Motor: Weakness (generalized) present.      Comments: Will answer questions, minimal responses   Psychiatric:         Mood and Affect: Mood normal.         Behavior: Behavior normal.         Thought Content: Thought content normal.                Significant Labs: All pertinent labs within the past 24 hours have been reviewed.  Urine Culture: No results for input(s): "LABURIN" in the last 48 hours.  Urine Studies:   Recent Labs   Lab 11/11/23  1347   COLORU Yellow   APPEARANCEUA Cloudy*   PHUR 7.0   SPECGRAV 1.020   PROTEINUA 1+*   GLUCUA Negative   KETONESU 1+*   BILIRUBINUA Negative   OCCULTUA Negative   NITRITE Negative   UROBILINOGEN 2.0-3.0*   LEUKOCYTESUR 2+*   RBCUA 3   WBCUA >100*   BACTERIA Few*   SQUAMEPITHEL 1   HYALINECASTS 1     Recent Lab Results  (Last 5 results in the past 24 hours)        11/12/23  0516   11/11/23  1347   11/11/23  1341   11/11/23  1339   11/11/23  1338        RSV Ag by Molecular Method         Negative       Influenza A, Molecular         Negative       Influenza B, Molecular         Negative       Base Deficit       3.9  Comment: Value above reference range         Performed By:       Sapna         Specimen source       Arterial         Appearance, UA   Cloudy             Bacteria, UA   Few             Baso # 0.02               Basophil % 0.2               Bilirubin (UA)   Negative             Blood Culture, Routine     No Growth to date  [P]           Color, UA   Yellow             Differential Method Automated               Eos # 0.0               Eosinophil " % 0.0               FiO2       28.0         Flu A & B Source         Nasal Swab       Glucose, UA   Negative             Gran # (ANC) 9.8               Gran % 86.7               Hematocrit 31.3               Hemoglobin 10.4               Hyaline Casts, UA   1             Immature Grans (Abs) 0.05  Comment: Mild elevation in immature granulocytes is non specific and   can be seen in a variety of conditions including stress response,   acute inflammation, trauma and pregnancy. Correlation with other   laboratory and clinical findings is essential.                 Immature Granulocytes 0.4               Ketones, UA   1+             Leukocytes, UA   2+             LPM       2.0         Lymph # 0.8               Lymph % 6.9               MCH 30.9               MCHC 33.2               MCV 93               Microscopic Comment   SEE COMMENT  Comment: Other formed elements not mentioned in the report are not   present in the microscopic examination.                MODIFIED JAMIE'S TEST       POSITIVE         Mono # 0.7               Mono % 5.8               MPV 10.6               NITRITE UA   Negative             nRBC 0               O2DEVICE       Nasal Cannula         Occult Blood UA   Negative             pH, UA   7.0             Platelet Count 196               POC HCO3       27.8         POC PCO2       35.9         POC PH       7.489  Comment: Value above reference range         POC PO2       74.5  Comment: Value below reference range         POC SATURATED O2       97.5         POC Temp       37.0         Protein, UA   1+  Comment: Recommend a 24 hour urine protein or a urine   protein/creatinine ratio if globulin induced proteinuria is  clinically suspected.               RBC 3.37               RBC, UA   3             RDW 14.6               RSV Source         NP       SARS-CoV2 (COVID-19) Qualitative PCR         Not Detected  Comment: This test utilizes a real-time reverse transcription  polymerase chain reaction  procedure to amplify and   detect the SARS-CoV-2 and detect the SARS-CoV-2 N2 and E nucleic  acid targets. The analytical sensitivity (limit of detection) of   this assay is 250 copies/mL.    A Detected result implies that the patient is infected with the  SARS-CoV-2 virus and is presumed to be contagious.    A Not Detected result implies that the SARS-CoV-2 target nucleic  acids are not present above the limit of detection.  It does not  rule out the possibility of COVID-19 and should not be the sole  basis for treatment decisions. If COVID-19 is strongly suspected  based on clinical and epidemiological history, re-testing should  be considered.    This test is only for use under Food and Drug   Administration s Emergency Use Authorization (EUA).   Commercial reagents are provided by [employer].  Performance characteristics of the EUA have been   independently verified by [facility]   Department of Pathology and Laboratory Medicine.           Specific Gravity, UA   1.020             Specimen UA   Urine, Catheterized             Squam Epithel, UA   1             UROBILINOGEN UA   2.0-3.0             WBC, UA   >100             WBC 11.25                                       [P] - Preliminary Result               Significant Imaging: I have reviewed all pertinent imaging results/findings within the past 24 hours.    Assessment/Plan:     * Acute cystitis without hematuria  Patient on hospice services, was brought in to the ED with suspicion of stroke symptoms, was found to have UTI, patient was admitted for IV antibiotic therapy, no leukocytosis, afebrile currently, follow-up urine culture report, continue IV Rocephin      Dementia  Continue Namenda    Diabetes mellitus without complication  Patient's FSGs are controlled on current medication regimen.  Last A1c reviewed-   Lab Results   Component Value Date    HGBA1C 4.8 11/11/2023    HGBA1C 4.8 11/11/2023     Most recent fingerstick glucose reviewed-   Recent  Labs   Lab 11/12/23  0619   POCTGLUCOSE 164*     Current correctional scale  Low  Maintain anti-hyperglycemic dose as follows-   Antihyperglycemics (From admission, onward)    Start     Stop Route Frequency Ordered    11/11/23 1624  insulin aspart U-100 pen 0-5 Units         -- SubQ Before meals & nightly PRN 11/11/23 1611        Hold Oral hypoglycemics while patient is in the hospital.    Hypothyroidism  Continue levothyroxine 75 mcg daily      Depression, major, recurrent  Continue Effexor and Seroquel        Benign essential hypertension  Chronic, controlled. Latest blood pressure and vitals reviewed-     Temp:  [96.8 °F (36 °C)-99.6 °F (37.6 °C)]   Pulse:  [77-93]   Resp:  [17-36]   BP: (122-149)/(58-66)   SpO2:  [93 %-100 %] .   Home meds for hypertension were reviewed and noted below.   Hypertension Medications             valsartan-hydrochlorothiazide (DIOVAN-HCT) 80-12.5 mg per tablet Take 1 tablet by mouth once daily          While in the hospital, will manage blood pressure as follows; Continue home antihypertensive regimen    Will utilize p.r.n. blood pressure medication only if patient's blood pressure greater than 160/100 and she develops symptoms such as worsening chest pain or shortness of breath.    Arthropathy          VTE Risk Mitigation (From admission, onward)         Ordered     IP VTE HIGH RISK PATIENT  Once         11/11/23 1611     Place sequential compression device  Until discontinued         11/11/23 1611     Place BREANA hose  Until discontinued         11/11/23 1611                               Sandeep Scott MD  Department of Hospital Medicine  [place] [facility]    N/A  Family History   Problem Relation Age of Onset    Coronary artery disease Father     Coronary artery disease Brother     Diabetes Mellitus Brother     Diabetes Mellitus Paternal Grandmother      Pertinent information:

## 2023-11-12 NOTE — HPI
Patient 89-year-old female history of GERD, type 2 diabetes, hypothyroidism, dementia, hyperlipidemia patient ED from hospice care after not being arousable, ED physician talked to PCP, patient was found to have UTI, patient was admitted for IV antibiotics, plan to discharge back to hospice once stable

## 2023-11-13 PROBLEM — H10.31 ACUTE BACTERIAL CONJUNCTIVITIS OF RIGHT EYE: Status: ACTIVE | Noted: 2023-11-13

## 2023-11-13 LAB
ALBUMIN SERPL BCP-MCNC: 1.9 G/DL (ref 3.5–5.2)
ALP SERPL-CCNC: 87 U/L (ref 55–135)
ALT SERPL W/O P-5'-P-CCNC: 17 U/L (ref 10–44)
ANION GAP SERPL CALC-SCNC: 7 MMOL/L (ref 3–11)
AST SERPL-CCNC: 27 U/L (ref 10–40)
BACTERIA UR CULT: ABNORMAL
BASOPHILS # BLD AUTO: 0.03 K/UL (ref 0–0.2)
BASOPHILS NFR BLD: 0.4 % (ref 0–1.9)
BILIRUB SERPL-MCNC: 0.4 MG/DL (ref 0.1–1)
BUN SERPL-MCNC: 10 MG/DL (ref 10–30)
CALCIUM SERPL-MCNC: 7.9 MG/DL (ref 8.7–10.5)
CHLORIDE SERPL-SCNC: 112 MMOL/L (ref 95–110)
CO2 SERPL-SCNC: 22 MMOL/L (ref 23–29)
CREAT SERPL-MCNC: 0.6 MG/DL (ref 0.5–1.4)
DIFFERENTIAL METHOD: ABNORMAL
EOSINOPHIL # BLD AUTO: 0 K/UL (ref 0–0.5)
EOSINOPHIL NFR BLD: 0.5 % (ref 0–8)
ERYTHROCYTE [DISTWIDTH] IN BLOOD BY AUTOMATED COUNT: 14.3 % (ref 11.5–14.5)
EST. GFR  (NO RACE VARIABLE): >60 ML/MIN/1.73 M^2
GLUCOSE SERPL-MCNC: 143 MG/DL (ref 70–110)
HCT VFR BLD AUTO: 30.1 % (ref 37–48.5)
HGB BLD-MCNC: 10 G/DL (ref 12–16)
IMM GRANULOCYTES # BLD AUTO: 0.04 K/UL (ref 0–0.04)
IMM GRANULOCYTES NFR BLD AUTO: 0.5 % (ref 0–0.5)
LYMPHOCYTES # BLD AUTO: 0.6 K/UL (ref 1–4.8)
LYMPHOCYTES NFR BLD: 7.4 % (ref 18–48)
MAGNESIUM SERPL-MCNC: 2 MG/DL (ref 1.6–2.6)
MCH RBC QN AUTO: 31.2 PG (ref 27–31)
MCHC RBC AUTO-ENTMCNC: 33.2 G/DL (ref 32–36)
MCV RBC AUTO: 94 FL (ref 82–98)
MONOCYTES # BLD AUTO: 0.6 K/UL (ref 0.3–1)
MONOCYTES NFR BLD: 8 % (ref 4–15)
NEUTROPHILS # BLD AUTO: 6.6 K/UL (ref 1.8–7.7)
NEUTROPHILS NFR BLD: 83.2 % (ref 38–73)
NRBC BLD-RTO: 0 /100 WBC
PLATELET # BLD AUTO: 195 K/UL (ref 150–450)
PMV BLD AUTO: 10.9 FL (ref 9.2–12.9)
POCT GLUCOSE: 151 MG/DL (ref 70–110)
POCT GLUCOSE: 179 MG/DL (ref 70–110)
POTASSIUM SERPL-SCNC: 3.2 MMOL/L (ref 3.5–5.1)
PROT SERPL-MCNC: 5.9 G/DL (ref 6–8.4)
RBC # BLD AUTO: 3.21 M/UL (ref 4–5.4)
SODIUM SERPL-SCNC: 141 MMOL/L (ref 136–145)
WBC # BLD AUTO: 7.89 K/UL (ref 3.9–12.7)

## 2023-11-13 PROCEDURE — 36415 COLL VENOUS BLD VENIPUNCTURE: CPT

## 2023-11-13 PROCEDURE — 25000003 PHARM REV CODE 250: Performed by: INTERNAL MEDICINE

## 2023-11-13 PROCEDURE — 25000003 PHARM REV CODE 250

## 2023-11-13 PROCEDURE — 63600175 PHARM REV CODE 636 W HCPCS: Performed by: INTERNAL MEDICINE

## 2023-11-13 PROCEDURE — 80053 COMPREHEN METABOLIC PANEL: CPT

## 2023-11-13 PROCEDURE — 85025 COMPLETE CBC W/AUTO DIFF WBC: CPT

## 2023-11-13 PROCEDURE — 11000001 HC ACUTE MED/SURG PRIVATE ROOM

## 2023-11-13 PROCEDURE — 83735 ASSAY OF MAGNESIUM: CPT

## 2023-11-13 RX ORDER — SODIUM CHLORIDE 9 MG/ML
INJECTION, SOLUTION INTRAVENOUS CONTINUOUS
Status: DISCONTINUED | OUTPATIENT
Start: 2023-11-13 | End: 2023-11-14

## 2023-11-13 RX ORDER — MOXIFLOXACIN 5 MG/ML
1 SOLUTION/ DROPS OPHTHALMIC 3 TIMES DAILY
Status: DISCONTINUED | OUTPATIENT
Start: 2023-11-13 | End: 2023-11-16 | Stop reason: HOSPADM

## 2023-11-13 RX ADMIN — FAMOTIDINE 20 MG: 10 INJECTION, SOLUTION INTRAVENOUS at 08:11

## 2023-11-13 RX ADMIN — MOXIFLOXACIN 1 DROP: 5 SOLUTION/ DROPS OPHTHALMIC at 03:11

## 2023-11-13 RX ADMIN — SODIUM CHLORIDE: 9 INJECTION, SOLUTION INTRAVENOUS at 03:11

## 2023-11-13 RX ADMIN — MOXIFLOXACIN 1 DROP: 5 SOLUTION/ DROPS OPHTHALMIC at 09:11

## 2023-11-13 RX ADMIN — CEFTRIAXONE 2 G: 2 INJECTION, POWDER, FOR SOLUTION INTRAMUSCULAR; INTRAVENOUS at 03:11

## 2023-11-13 RX ADMIN — SODIUM CHLORIDE: 9 INJECTION, SOLUTION INTRAVENOUS at 10:11

## 2023-11-13 RX ADMIN — MOXIFLOXACIN 1 DROP: 5 SOLUTION/ DROPS OPHTHALMIC at 10:11

## 2023-11-13 NOTE — ASSESSMENT & PLAN NOTE
Chronic, controlled. Latest blood pressure and vitals reviewed-     Temp:  [98 °F (36.7 °C)-99.7 °F (37.6 °C)]   Pulse:  [86-89]   Resp:  [18-22]   BP: (138-148)/(63-67)   SpO2:  [96 %-100 %] .   Home meds for hypertension were reviewed and noted below.   Hypertension Medications             valsartan-hydrochlorothiazide (DIOVAN-HCT) 80-12.5 mg per tablet Take 1 tablet by mouth once daily          While in the hospital, will manage blood pressure as follows; Continue home antihypertensive regimen    Will utilize p.r.n. blood pressure medication only if patient's blood pressure greater than 160/100 and she develops symptoms such as worsening chest pain or shortness of breath.

## 2023-11-13 NOTE — ASSESSMENT & PLAN NOTE
Patient's FSGs are controlled on current medication regimen.  Last A1c reviewed-   Lab Results   Component Value Date    HGBA1C 4.8 11/11/2023    HGBA1C 4.8 11/11/2023     Most recent fingerstick glucose reviewed-   Recent Labs   Lab 11/12/23  1141   POCTGLUCOSE 121*       Current correctional scale  Low  Maintain anti-hyperglycemic dose as follows-   Antihyperglycemics (From admission, onward)    Start     Stop Route Frequency Ordered    11/11/23 1624  insulin aspart U-100 pen 0-5 Units         -- SubQ Before meals & nightly PRN 11/11/23 1611        Hold Oral hypoglycemics while patient is in the hospital.

## 2023-11-13 NOTE — PLAN OF CARE
11/13/23 1602   Medicare Message   Important Message from Medicare regarding Discharge Appeal Rights Given to patient/caregiver;Explained to patient/caregiver;Signed/date by patient/caregiver   Date IMM was signed 11/13/23   Time IMM was signed [id number]

## 2023-11-13 NOTE — SUBJECTIVE & OBJECTIVE
Past Medical History:   Diagnosis Date    Anemia, unspecified     Anxiety disorder, unspecified     Asthma     Carpal tunnel syndrome     Chills     Constipation     Dementia     Depression     Dermatosis     Diabetes mellitus out of control     Diabetes mellitus type 2, controlled     Diarrhea     Dizziness     Gastroenteritis     Gastroesophageal reflux     GI hemorrhage     Heartburn     HTN (hypertension)     Hypothyroidism, unspecified     Impaction of the bowels     Irritable bowel syndrome     Major depressive disorder, single episode, unspecified     Malaise and fatigue     Melena     Mild intermittent asthma, uncomplicated     Mixed hyperlipidemia     Nausea with vomiting     Osteopenia     Rheumatoid arthritis, unspecified     Rosacea     Sleep apnea, unspecified     Unspecified macular degeneration     Unspecified osteoarthritis, unspecified site     Vitamin D deficiency     Weakness        Past Surgical History:   Procedure Laterality Date    CARPAL TUNNEL RELEASE  2006    lt. hand    CATARACT EXTRACTION  2006    CHOLECYSTECTOMY      COLONOSCOPY  1997    COSMETIC SURGERY      FACE LIFT    HEMORRHOID SURGERY      RHINOPLASTY         Review of patient's allergies indicates:   Allergen Reactions    Sulfamethoxazole-trimethoprim Hives     Other reaction(s): hives       No current facility-administered medications on file prior to encounter.     Current Outpatient Medications on File Prior to Encounter   Medication Sig    cholecalciferol, vitamin D3, (VITAMIN D3) 50 mcg (2,000 unit) Cap capsule 1 capsule.    esomeprazole (NEXIUM) 40 MG capsule TAKE 1 CAPSULE BY MOUTH EVERY DAY BEFORE BREAKFAST.    LANTUS SOLOSTAR U-100 INSULIN glargine 100 units/mL SubQ pen INJECT 60 UNITS SUBCUTANEOUSLY ONCE DAILY    levothyroxine (SYNTHROID) 75 MCG tablet Take 1 tablet by mouth once daily    meclizine (ANTIVERT) 25 mg tablet TAKE 1 TABLET BY MOUTH THREE TIMES DAILY AS NEEDED FOR DIZZINESS    memantine (NAMENDA) 10 MG Tab  "Take 1 tablet by mouth twice daily    metoclopramide HCl (REGLAN) 5 MG tablet TAKE 1 TABLET BY MOUTH 4 TIMES DAILY 30 MINUTES BEFORE  MEALS  AND  AT  BEDTIME    multivit-min/iron/folic/lutein (CENTRUM SILVER WOMEN ORAL) Take by mouth.    pioglitazone (ACTOS) 30 MG tablet Take 1 tablet by mouth once daily    QUEtiapine (SEROQUEL) 100 MG Tab TAKE 1 TABLET BY MOUTH ONCE DAILY AT BEDTIME    simvastatin (ZOCOR) 40 MG tablet TAKE 1 TABLET BY MOUTH ONCE DAILY IN THE EVENING    valsartan-hydrochlorothiazide (DIOVAN-HCT) 80-12.5 mg per tablet Take 1 tablet by mouth once daily    venlafaxine (EFFEXOR-XR) 150 MG Cp24 TAKE 1 CAPSULE BY MOUTH EVERY DAY    vit A/vit C/vit E/zinc/copper (PRESERVISION AREDS ORAL) Take by mouth.    vitamin E 400 UNIT capsule 1 capsule.    BINAXNOW COVID-19 AG SELF TEST Kit Use as Directed on the Package    iron [id number]-G-[id number]-Ca-suc-stoma (MULTIGEN) 70 mg-150 mg-10 mcg-2 mg-75 mg Tab tablet TAKE 1 TABLET BY MOUTH EVERY DAY    pen needle, diabetic 32 gauge x 5/32" Ndle 1 applicator by Misc.(Non-Drug; Combo Route) route once daily.     Family History       Problem Relation (Age of Onset)    Coronary artery disease Father, Brother    Diabetes Mellitus Brother, Paternal Grandmother          Tobacco Use    Smoking status: Former     Current packs/day: 1.00     Types: Cigarettes    Smokeless tobacco: Never    Tobacco comments:     started age 18 stopped age 55   Substance and Sexual Activity    Alcohol use: Not Currently    Drug use: Never    Sexual activity: Not on file     Review of Systems   Unable to perform ROS: Dementia     Objective:     Vital Signs (Most Recent):  Temp: 99.1 °F (37.3 °C) (11/13/23 0729)  Pulse: 88 (11/13/23 0729)  Resp: (!) 22 (11/13/23 0729)  BP: (!) 148/65 (11/13/23 0729)  SpO2: 99 % (11/13/23 0729) Vital Signs (24h Range):  Temp:  [98 °F (36.7 °C)-99.7 °F (37.6 °C)] 99.1 °F (37.3 °C)  Pulse:  [86-89] 88  Resp:  [18-22] 22  SpO2:  [96 %-100 %] 99 %  BP: (138-148)/(63-67) 148/65 "     Weight: 73.9 kg (162 lb 14.4 oz)  Body mass index is 30.78 kg/m².     Physical Exam  Vitals and nursing note reviewed.   Constitutional:       General: She is not in acute distress.     Appearance: She is well-developed. She is ill-appearing. She is not diaphoretic.      Comments: Frail, chronically ill-appearing   HENT:      Head: Normocephalic and atraumatic.      Nose: No congestion or rhinorrhea.      Mouth/Throat:      Mouth: Mucous membranes are moist.      Pharynx: Oropharynx is clear. No oropharyngeal exudate or posterior oropharyngeal erythema.   Eyes:      General: No scleral icterus.        Right eye: Discharge present.         Left eye: No discharge.      Extraocular Movements: Extraocular movements intact.      Conjunctiva/sclera:      Right eye: Exudate present.   Cardiovascular:      Rate and Rhythm: Normal rate and regular rhythm.      Pulses: Normal pulses.      Heart sounds: Normal heart sounds. No murmur heard.     No friction rub. No gallop.   Pulmonary:      Effort: Pulmonary effort is normal. No respiratory distress.      Breath sounds: No stridor. No wheezing, rhonchi or rales.   Chest:      Chest wall: No tenderness.   Abdominal:      General: Bowel sounds are normal. There is no distension.      Palpations: Abdomen is soft.      Tenderness: There is no abdominal tenderness. There is no guarding.   Musculoskeletal:         General: Swelling present.      Cervical back: Normal range of motion and neck supple.      Right lower leg: No edema.      Left lower leg: No edema.      Comments: Edema to upper extremities   Skin:     General: Skin is warm and dry.      Capillary Refill: Capillary refill takes less than 2 seconds.      Coloration: Skin is pale.   Neurological:      Mental Status: She is alert.      Motor: Weakness (generalized) present.      Comments: Will answer questions, minimal responses   Psychiatric:         Mood and Affect: Mood normal.         Behavior: Behavior normal.          Thought Content: Thought content normal.                Significant Labs: All pertinent labs within the past 24 hours have been reviewed.  Urine Culture:   Recent Labs   Lab 11/11/23  1347   LABURIN AEROCOCCUS URINAE  > 100,000 cfu/ml  *     Urine Studies:   Recent Labs   Lab 11/11/23  1347   COLORU Yellow   APPEARANCEUA Cloudy*   PHUR 7.0   SPECGRAV 1.020   PROTEINUA 1+*   GLUCUA Negative   KETONESU 1+*   BILIRUBINUA Negative   OCCULTUA Negative   NITRITE Negative   UROBILINOGEN 2.0-3.0*   LEUKOCYTESUR 2+*   RBCUA 3   WBCUA >100*   BACTERIA Few*   SQUAMEPITHEL 1   HYALINECASTS 1       Recent Lab Results         11/12/23  1141        POCT Glucose 121               Significant Imaging: I have reviewed all pertinent imaging results/findings within the past 24 hours.

## 2023-11-13 NOTE — HOSPITAL COURSE
11/13 GT:  Patient is lying in bed this morning, lethargic, minimally responsive to verbal stimuli.  Patient noted with mucopurulent drainage from right eye, this was cleaned during assessment, requested nursing finish cleaning her eye.  Vigamox dropped ordered for treatment.  Patient's urine culture demonstrates aerococcus urinae, awaiting sensitivity report.  Blood cultures are negative to date.  Patient is already on hospice services and will return home with hospice once UTI is treated appropriately.  Labs were not drawn this morning, orders have been entered, awaiting results.    11/14 GT:  Patient remains lethargic but will arouse to tactile stimuli.  No sensitivity to urine culture due to organism having difficulty growing on the media disc.  Our staff pharmacist did speak with Infectious Disease in the [place], antibiotic recommendations made.  We will switch patient's antibiotics to ampicillin today.  We will give a 1 time dose of Lasix due to edema to bilateral upper extremities, lower extremities remain without edema.  Patient remains hypokalemic, we will start oral potassium replacement and switch IV fluids to D5 half-normal saline with KCl 20 mEq at 50 an hour.  Blood cultures remain negative to date.    11/15 GT:  Patient remains lethargic, will open her eyes for a few seconds, but immediately closes them.  Patient not verbally responsive.  Son at bedside and we have discussed that patient appears to be end-stage with 3rd spacing, no p.o. intake.  Son reports who prefer to continue hospice care at a nursing home in [place] due to this being closer to them.  Case management consult for nursing home placement entered, ppd panel entered, patient had a chest x-ray on admission.  We will give additional dose of diuresis today and discontinue IV fluids.  We will give 20 mEq potassium rider.    11/16 GT:  Patient's status is unchanged from yesterday.  After discussion between  and  patient's family, family wanted her to go to an inpatient hospice facility, but she did not meet inpatient hospice criteria.  Patient will instead go to [facility] as a hospice patient with current hospice company.  One hundred forty-two signed by MD, and as soon as it is reviewed and approved patient will be discharged.    DC Summary: Approval received. Patient will be discharged to [facility] with hospice services.  Tylenol suppositories sent to [facility], all other medications discontinued due to patient not taking in any oral intake.  Hospice company to provide E kit.

## 2023-11-13 NOTE — ASSESSMENT & PLAN NOTE
Patient on hospice services, was brought in to the ED with suspicion of stroke symptoms, was found to have UTI, patient was admitted for IV antibiotic therapy, no leukocytosis, afebrile currently, follow-up urine culture report, continue IV Rocephin.    11/13 GT:  Urine culture demonstrates aerococcus urinae, awaiting sensitivity report.  Continue IV Rocephin.  Awaiting morning labs.

## 2023-11-14 PROBLEM — E87.6 HYPOKALEMIA: Status: ACTIVE | Noted: 2023-11-14

## 2023-11-14 LAB
ALBUMIN SERPL BCP-MCNC: 1.8 G/DL (ref 3.5–5.2)
ALP SERPL-CCNC: 85 U/L (ref 55–135)
ALT SERPL W/O P-5'-P-CCNC: 20 U/L (ref 10–44)
ANION GAP SERPL CALC-SCNC: 8 MMOL/L (ref 3–11)
AST SERPL-CCNC: 30 U/L (ref 10–40)
BASOPHILS # BLD AUTO: 0.02 K/UL (ref 0–0.2)
BASOPHILS NFR BLD: 0.3 % (ref 0–1.9)
BILIRUB SERPL-MCNC: 0.4 MG/DL (ref 0.1–1)
BUN SERPL-MCNC: 9 MG/DL (ref 10–30)
CALCIUM SERPL-MCNC: 8 MG/DL (ref 8.7–10.5)
CHLORIDE SERPL-SCNC: 113 MMOL/L (ref 95–110)
CO2 SERPL-SCNC: 21 MMOL/L (ref 23–29)
CREAT SERPL-MCNC: 0.6 MG/DL (ref 0.5–1.4)
DIFFERENTIAL METHOD: ABNORMAL
EOSINOPHIL # BLD AUTO: 0.1 K/UL (ref 0–0.5)
EOSINOPHIL NFR BLD: 0.8 % (ref 0–8)
ERYTHROCYTE [DISTWIDTH] IN BLOOD BY AUTOMATED COUNT: 14.4 % (ref 11.5–14.5)
EST. GFR  (NO RACE VARIABLE): >60 ML/MIN/1.73 M^2
GLUCOSE SERPL-MCNC: 147 MG/DL (ref 70–110)
HCT VFR BLD AUTO: 31.3 % (ref 37–48.5)
HGB BLD-MCNC: 10.4 G/DL (ref 12–16)
IMM GRANULOCYTES # BLD AUTO: 0.05 K/UL (ref 0–0.04)
IMM GRANULOCYTES NFR BLD AUTO: 0.7 % (ref 0–0.5)
LYMPHOCYTES # BLD AUTO: 0.5 K/UL (ref 1–4.8)
LYMPHOCYTES NFR BLD: 6.8 % (ref 18–48)
MAGNESIUM SERPL-MCNC: 1.9 MG/DL (ref 1.6–2.6)
MCH RBC QN AUTO: 31.1 PG (ref 27–31)
MCHC RBC AUTO-ENTMCNC: 33.2 G/DL (ref 32–36)
MCV RBC AUTO: 94 FL (ref 82–98)
MONOCYTES # BLD AUTO: 0.6 K/UL (ref 0.3–1)
MONOCYTES NFR BLD: 7.9 % (ref 4–15)
NEUTROPHILS # BLD AUTO: 6.2 K/UL (ref 1.8–7.7)
NEUTROPHILS NFR BLD: 83.5 % (ref 38–73)
NRBC BLD-RTO: 0 /100 WBC
PLATELET # BLD AUTO: 182 K/UL (ref 150–450)
PMV BLD AUTO: 10.6 FL (ref 9.2–12.9)
POCT GLUCOSE: 201 MG/DL (ref 70–110)
POCT GLUCOSE: 241 MG/DL (ref 70–110)
POLYCHROMASIA BLD QL SMEAR: ABNORMAL
POTASSIUM SERPL-SCNC: 3.3 MMOL/L (ref 3.5–5.1)
PROT SERPL-MCNC: 5.8 G/DL (ref 6–8.4)
RBC # BLD AUTO: 3.34 M/UL (ref 4–5.4)
SODIUM SERPL-SCNC: 142 MMOL/L (ref 136–145)
WBC # BLD AUTO: 7.36 K/UL (ref 3.9–12.7)

## 2023-11-14 PROCEDURE — 36415 COLL VENOUS BLD VENIPUNCTURE: CPT

## 2023-11-14 PROCEDURE — 83735 ASSAY OF MAGNESIUM: CPT

## 2023-11-14 PROCEDURE — 85025 COMPLETE CBC W/AUTO DIFF WBC: CPT

## 2023-11-14 PROCEDURE — 11000001 HC ACUTE MED/SURG PRIVATE ROOM

## 2023-11-14 PROCEDURE — 25000003 PHARM REV CODE 250

## 2023-11-14 PROCEDURE — 63600175 PHARM REV CODE 636 W HCPCS

## 2023-11-14 PROCEDURE — 25000003 PHARM REV CODE 250: Performed by: INTERNAL MEDICINE

## 2023-11-14 PROCEDURE — 80053 COMPREHEN METABOLIC PANEL: CPT

## 2023-11-14 RX ORDER — DEXTROSE MONOHYDRATE, SODIUM CHLORIDE, AND POTASSIUM CHLORIDE 50; 1.49; 4.5 G/1000ML; G/1000ML; G/1000ML
INJECTION, SOLUTION INTRAVENOUS CONTINUOUS
Status: DISCONTINUED | OUTPATIENT
Start: 2023-11-14 | End: 2023-11-15

## 2023-11-14 RX ORDER — POTASSIUM CHLORIDE 20 MEQ/1
20 TABLET, EXTENDED RELEASE ORAL 2 TIMES DAILY
Status: DISCONTINUED | OUTPATIENT
Start: 2023-11-14 | End: 2023-11-15

## 2023-11-14 RX ORDER — FUROSEMIDE 10 MG/ML
20 INJECTION INTRAMUSCULAR; INTRAVENOUS ONCE
Status: COMPLETED | OUTPATIENT
Start: 2023-11-14 | End: 2023-11-14

## 2023-11-14 RX ORDER — FAMOTIDINE 10 MG/ML
20 INJECTION INTRAVENOUS 2 TIMES DAILY
Status: DISCONTINUED | OUTPATIENT
Start: 2023-11-14 | End: 2023-11-16 | Stop reason: HOSPADM

## 2023-11-14 RX ADMIN — DEXTROSE, SODIUM CHLORIDE, AND POTASSIUM CHLORIDE: 5; .45; .15 INJECTION INTRAVENOUS at 11:11

## 2023-11-14 RX ADMIN — FAMOTIDINE 20 MG: 10 INJECTION, SOLUTION INTRAVENOUS at 09:11

## 2023-11-14 RX ADMIN — FUROSEMIDE 20 MG: 10 INJECTION, SOLUTION INTRAMUSCULAR; INTRAVENOUS at 10:11

## 2023-11-14 RX ADMIN — SODIUM CHLORIDE: 9 INJECTION, SOLUTION INTRAVENOUS at 02:11

## 2023-11-14 RX ADMIN — MOXIFLOXACIN 1 DROP: 5 SOLUTION/ DROPS OPHTHALMIC at 10:11

## 2023-11-14 RX ADMIN — MOXIFLOXACIN 1 DROP: 5 SOLUTION/ DROPS OPHTHALMIC at 03:11

## 2023-11-14 RX ADMIN — AMPICILLIN SODIUM 1 G: 1 INJECTION, POWDER, FOR SOLUTION INTRAMUSCULAR; INTRAVENOUS at 03:11

## 2023-11-14 RX ADMIN — MOXIFLOXACIN 1 DROP: 5 SOLUTION/ DROPS OPHTHALMIC at 09:11

## 2023-11-14 RX ADMIN — AMPICILLIN SODIUM 1 G: 1 INJECTION, POWDER, FOR SOLUTION INTRAMUSCULAR; INTRAVENOUS at 10:11

## 2023-11-14 RX ADMIN — AMPICILLIN SODIUM 1 G: 1 INJECTION, POWDER, FOR SOLUTION INTRAMUSCULAR; INTRAVENOUS at 11:11

## 2023-11-14 NOTE — ASSESSMENT & PLAN NOTE
Chronic, controlled. Latest blood pressure and vitals reviewed-     Temp:  [99.1 °F (37.3 °C)-100 °F (37.8 °C)]   Pulse:  [80-85]   Resp:  [17-22]   BP: (114-162)/(59-71)   SpO2:  [94 %-97 %] .   Home meds for hypertension were reviewed and noted below.   Hypertension Medications             valsartan-hydrochlorothiazide (DIOVAN-HCT) 80-12.5 mg per tablet Take 1 tablet by mouth once daily          While in the hospital, will manage blood pressure as follows; Continue home antihypertensive regimen    Will utilize p.r.n. blood pressure medication only if patient's blood pressure greater than 160/100 and she develops symptoms such as worsening chest pain or shortness of breath.

## 2023-11-14 NOTE — PROGRESS NOTES
Pharmacist Renal Dose Adjustment Note    Nan Holman is a 89 y.o. female being treated with the medication  Ampicillin    Patient Data:    Vital Signs (Most Recent):  Temp: 99.1 °F (37.3 °C) (11/14/23 0809)  Pulse: 82 (11/14/23 0809)  Resp: (!) 22 (11/14/23 0809)  BP: (!) 162/71 (11/14/23 0809)  SpO2: 96 % (11/14/23 0809) Vital Signs (72h Range):  Temp:  [96.8 °F (36 °C)-100 °F (37.8 °C)]   Pulse:  [77-93]   Resp:  [17-36]   BP: (114-162)/(58-71)   SpO2:  [93 %-100 %]      Recent Labs   Lab 11/12/23  0516 11/13/23  1128 11/14/23  0612   CREATININE 0.8 0.6 0.6     Serum creatinine: 0.6 mg/dL 11/14/23 0612  Estimated creatinine clearance: 56.1 mL/min    Medication: Ampicillin dose: 1 gram frequency  Q8H will be changed to medication: Ampicillin dose: 1 gram frequency: Q6H    Pharmacist's Name: Gino Morel  Pharmacist's Extension: [phone]

## 2023-11-14 NOTE — ASSESSMENT & PLAN NOTE
Patient's FSGs are controlled on current medication regimen.  Last A1c reviewed-   Lab Results   Component Value Date    HGBA1C 4.8 11/11/2023    HGBA1C 4.8 11/11/2023     Most recent fingerstick glucose reviewed-   Recent Labs   Lab 11/13/23  1010 11/13/23  1631   POCTGLUCOSE 151* 179*       Current correctional scale  Low  Maintain anti-hyperglycemic dose as follows-   Antihyperglycemics (From admission, onward)    Start     Stop Route Frequency Ordered    11/11/23 1624  insulin aspart U-100 pen 0-5 Units         -- SubQ Before meals & nightly PRN 11/11/23 1611        Hold Oral hypoglycemics while patient is in the hospital.

## 2023-11-14 NOTE — SUBJECTIVE & OBJECTIVE
Past Medical History:   Diagnosis Date    Anemia, unspecified     Anxiety disorder, unspecified     Asthma     Carpal tunnel syndrome     Chills     Constipation     Dementia     Depression     Dermatosis     Diabetes mellitus out of control     Diabetes mellitus type 2, controlled     Diarrhea     Dizziness     Gastroenteritis     Gastroesophageal reflux     GI hemorrhage     Heartburn     HTN (hypertension)     Hypothyroidism, unspecified     Impaction of the bowels     Irritable bowel syndrome     Major depressive disorder, single episode, unspecified     Malaise and fatigue     Melena     Mild intermittent asthma, uncomplicated     Mixed hyperlipidemia     Nausea with vomiting     Osteopenia     Rheumatoid arthritis, unspecified     Rosacea     Sleep apnea, unspecified     Unspecified macular degeneration     Unspecified osteoarthritis, unspecified site     Vitamin D deficiency     Weakness        Past Surgical History:   Procedure Laterality Date    CARPAL TUNNEL RELEASE  2006    lt. hand    CATARACT EXTRACTION  2006    CHOLECYSTECTOMY      COLONOSCOPY  1997    COSMETIC SURGERY      FACE LIFT    HEMORRHOID SURGERY      RHINOPLASTY         Review of patient's allergies indicates:   Allergen Reactions    Sulfamethoxazole-trimethoprim Hives     Other reaction(s): hives       No current facility-administered medications on file prior to encounter.     Current Outpatient Medications on File Prior to Encounter   Medication Sig    cholecalciferol, vitamin D3, (VITAMIN D3) 50 mcg (2,000 unit) Cap capsule 1 capsule.    esomeprazole (NEXIUM) 40 MG capsule TAKE 1 CAPSULE BY MOUTH EVERY DAY BEFORE BREAKFAST.    LANTUS SOLOSTAR U-100 INSULIN glargine 100 units/mL SubQ pen INJECT 60 UNITS SUBCUTANEOUSLY ONCE DAILY    levothyroxine (SYNTHROID) 75 MCG tablet Take 1 tablet by mouth once daily    meclizine (ANTIVERT) 25 mg tablet TAKE 1 TABLET BY MOUTH THREE TIMES DAILY AS NEEDED FOR DIZZINESS    memantine (NAMENDA) 10 MG Tab  "Take 1 tablet by mouth twice daily    metoclopramide HCl (REGLAN) 5 MG tablet TAKE 1 TABLET BY MOUTH 4 TIMES DAILY 30 MINUTES BEFORE  MEALS  AND  AT  BEDTIME    multivit-min/iron/folic/lutein (CENTRUM SILVER WOMEN ORAL) Take by mouth.    pioglitazone (ACTOS) 30 MG tablet Take 1 tablet by mouth once daily    QUEtiapine (SEROQUEL) 100 MG Tab TAKE 1 TABLET BY MOUTH ONCE DAILY AT BEDTIME    simvastatin (ZOCOR) 40 MG tablet TAKE 1 TABLET BY MOUTH ONCE DAILY IN THE EVENING    valsartan-hydrochlorothiazide (DIOVAN-HCT) 80-12.5 mg per tablet Take 1 tablet by mouth once daily    venlafaxine (EFFEXOR-XR) 150 MG Cp24 TAKE 1 CAPSULE BY MOUTH EVERY DAY    vit A/vit C/vit E/zinc/copper (PRESERVISION AREDS ORAL) Take by mouth.    vitamin E 400 UNIT capsule 1 capsule.    BINAXNOW COVID-19 AG SELF TEST Kit Use as Directed on the Package    iron afymeq-R-D06-Ca-suc-stoma (MULTIGEN) 70 mg-150 mg-10 mcg-2 mg-75 mg Tab tablet TAKE 1 TABLET BY MOUTH EVERY DAY    pen needle, diabetic 32 gauge x 5/32" Ndle 1 applicator by Misc.(Non-Drug; Combo Route) route once daily.     Family History       Problem Relation (Age of Onset)    Coronary artery disease Father, Brother    Diabetes Mellitus Brother, Paternal Grandmother          Tobacco Use    Smoking status: Former     Current packs/day: 1.00     Types: Cigarettes    Smokeless tobacco: Never    Tobacco comments:     started age 18 stopped age 55   Substance and Sexual Activity    Alcohol use: Not Currently    Drug use: Never    Sexual activity: Not on file     Review of Systems   Unable to perform ROS: Dementia     Objective:     Vital Signs (Most Recent):  Temp: 99.1 °F (37.3 °C) (11/14/23 0809)  Pulse: 82 (11/14/23 0809)  Resp: (!) 22 (11/14/23 0809)  BP: (!) 162/71 (11/14/23 0809)  SpO2: 96 % (11/14/23 0809) Vital Signs (24h Range):  Temp:  [99.1 °F (37.3 °C)-100 °F (37.8 °C)] 99.1 °F (37.3 °C)  Pulse:  [80-85] 82  Resp:  [17-22] 22  SpO2:  [94 %-97 %] 96 %  BP: (114-162)/(59-71) 162/71 "     Weight: 73.9 kg (162 lb 14.4 oz)  Body mass index is 30.78 kg/m².     Physical Exam  Vitals and nursing note reviewed.   Constitutional:       General: She is not in acute distress.     Appearance: She is well-developed. She is ill-appearing. She is not diaphoretic.      Comments: Frail, chronically ill-appearing   HENT:      Head: Normocephalic and atraumatic.      Nose: No congestion or rhinorrhea.      Mouth/Throat:      Mouth: Mucous membranes are moist.      Pharynx: Oropharynx is clear. No oropharyngeal exudate or posterior oropharyngeal erythema.   Eyes:      General: No scleral icterus.        Right eye: Discharge present.         Left eye: No discharge.      Extraocular Movements: Extraocular movements intact.      Conjunctiva/sclera:      Right eye: Exudate present.   Cardiovascular:      Rate and Rhythm: Normal rate and regular rhythm.      Pulses: Normal pulses.      Heart sounds: Normal heart sounds. No murmur heard.     No friction rub. No gallop.   Pulmonary:      Effort: Pulmonary effort is normal. No respiratory distress.      Breath sounds: No stridor. No wheezing, rhonchi or rales.   Chest:      Chest wall: No tenderness.   Abdominal:      General: Bowel sounds are normal. There is no distension.      Palpations: Abdomen is soft.      Tenderness: There is no abdominal tenderness. There is no guarding.   Musculoskeletal:         General: Swelling present.      Cervical back: Normal range of motion and neck supple.      Right lower leg: No edema.      Left lower leg: No edema.      Comments: Edema to upper extremities   Skin:     General: Skin is warm and dry.      Capillary Refill: Capillary refill takes less than 2 seconds.      Coloration: Skin is pale.   Neurological:      Mental Status: She is lethargic.      Motor: Weakness (generalized) present.   Psychiatric:         Mood and Affect: Mood normal.         Behavior: Behavior normal.         Thought Content: Thought content normal.                 Significant Labs: All pertinent labs within the past 24 hours have been reviewed.      Recent Lab Results         11/14/23  0612   11/13/23  1631   11/13/23  1128   11/13/23  1010        Albumin 1.8     1.9         ALP 85     87         ALT 20     17         Anion Gap 8     7         AST 30     27         Baso # 0.02     0.03         Basophil % 0.3     0.4         BILIRUBIN TOTAL 0.4  Comment: For infants and newborns, interpretation of results should be based  on gestational age, weight and in agreement with clinical  observations.    Premature Infant recommended reference ranges:  Up to 24 hours.............<8.0 mg/dL  Up to 48 hours............<12.0 mg/dL  3-5 days..................<15.0 mg/dL  6-29 days.................<15.0 mg/dL    For patients on Eltrombopag therapy, use of [employer] [place] TBIL is   not   recommended.       0.4  Comment: For infants and newborns, interpretation of results should be based  on gestational age, weight and in agreement with clinical  observations.    Premature Infant recommended reference ranges:  Up to 24 hours.............<8.0 mg/dL  Up to 48 hours............<12.0 mg/dL  3-5 days..................<15.0 mg/dL  6-29 days.................<15.0 mg/dL    For patients on Eltrombopag therapy, use of [employer] [place] TBIL is   not   recommended.           BUN 9     10         Calcium 8.0     7.9         Chloride 113     112         CO2 21     22         Creatinine 0.6     0.6         Differential Method Automated     Automated         eGFR >60.0     >60.0         Eos # 0.1     0.0         Eosinophil % 0.8     0.5         Glucose 147     143         Gran # (ANC) 6.2     6.6         Gran % 83.5     83.2         Hematocrit 31.3     30.1         Hemoglobin 10.4     10.0         Immature Grans (Abs) 0.05  Comment: Mild elevation in immature granulocytes is non specific and   can be seen in a variety of conditions including stress response,   acute inflammation, trauma and pregnancy.  Correlation with other   laboratory and clinical findings is essential.       0.04  Comment: Mild elevation in immature granulocytes is non specific and   can be seen in a variety of conditions including stress response,   acute inflammation, trauma and pregnancy. Correlation with other   laboratory and clinical findings is essential.           Immature Granulocytes 0.7     0.5         Lymph # 0.5     0.6         Lymph % 6.8     7.4         Magnesium  1.9     2.0         MCH 31.1     31.2         MCHC 33.2     33.2         MCV 94     94         Mono # 0.6     0.6         Mono % 7.9     8.0         MPV 10.6     10.9         nRBC 0     0         Platelet Count 182     195         POCT Glucose   179     151       Poly Occasional             Potassium 3.3     3.2         PROTEIN TOTAL 5.8     5.9         RBC 3.34     3.21         RDW 14.4     14.3         Sodium 142     141         WBC 7.36     7.89                 Significant Imaging: I have reviewed all pertinent imaging results/findings within the past 24 hours.

## 2023-11-14 NOTE — PLAN OF CARE
POC reviewed with pt      Problem: Infection  Goal: Absence of Infection Signs and Symptoms  Outcome: Ongoing, Progressing     Problem: Adult Inpatient Plan of Care  Goal: Plan of Care Review  Outcome: Ongoing, Progressing  Goal: Patient-Specific Goal (Individualized)  Outcome: Ongoing, Progressing  Goal: Absence of Hospital-Acquired Illness or Injury  Outcome: Ongoing, Progressing  Goal: Optimal Comfort and Wellbeing  Outcome: Ongoing, Progressing  Goal: Readiness for Transition of Care  Outcome: Ongoing, Progressing     Problem: Diabetes Comorbidity  Goal: Blood Glucose Level Within Targeted Range  Outcome: Ongoing, Progressing     Problem: Fall Injury Risk  Goal: Absence of Fall and Fall-Related Injury  Outcome: Ongoing, Progressing     Problem: Skin Injury Risk Increased  Goal: Skin Health and Integrity  Outcome: Ongoing, Progressing     Problem: Confusion Chronic  Goal: Optimal Cognitive Function  Outcome: Ongoing, Progressing

## 2023-11-14 NOTE — ASSESSMENT & PLAN NOTE
Patient has hypokalemia which is Acute and currently controlled. Most recent potassium levels reviewed-   Lab Results   Component Value Date    K 3.3 (L) 11/14/2023     Start d5 1/2 NS with Kcl 20 mEq at 50 mL/hr. Monitor with daily labs.

## 2023-11-14 NOTE — ASSESSMENT & PLAN NOTE
Patient on hospice services, was brought in to the ED with suspicion of stroke symptoms, was found to have UTI, patient was admitted for IV antibiotic therapy, no leukocytosis, afebrile currently, follow-up urine culture report, continue IV Rocephin.    11/13 GT:  Urine culture demonstrates aerococcus urinae, awaiting sensitivity report.  Continue IV Rocephin.  Awaiting morning labs.    11/14 GT: DC Rocephin, begin Ampicillin.

## 2023-11-14 NOTE — PLAN OF CARE
[facility]  Initial Discharge Assessment       Primary Care Provider: Joie Reid MD    Admission Diagnosis: Dementia [F03.90]  Acute cystitis without hematuria [N30.00]    Admission Date: 11/11/2023  Expected Discharge Date:          Payor: MEDICARE / Plan: MEDICARE PART A & B / Product Type: Government /     Extended Emergency Contact Information  Primary Emergency Contact: Shahbaz Holman  Mobile Phone: [phone]  Relation: Son  Preferred language: English   needed? No    Discharge Plan A: New Nursing Home placement - prison care facility, Hospice/home  Discharge Plan B: Other (TBD)      [facility] - [place], [place] - [address]  [address]  [facility] [address]  Phone: [phone] Fax: [phone]    [facility] - [place], [place] - [address]  [address]  [facility] [address]  Phone: [phone] Fax: [phone]    [id number] - [place], [place] - [address] AT [facility] [address]  [address]  [facility] [address]  Phone: [phone] Fax: [phone]      Initial Assessment (most recent)       Adult Discharge Assessment - 11/13/23 0934          Discharge Assessment    Assessment Type Discharge Planning Assessment     Confirmed/corrected address, phone number and insurance Yes     Confirmed Demographics Correct on Facesheet     Source of Information family     If unable to respond/provide information was family/caregiver contacted? Yes     Contact Name/Number Shahbaz (Son) [phone] (Mobile)     Reason For Admission Acute cystitis without hematuria     People in Home child(gabriella), adult     Do you expect to return to your current living situation? Other (see comments)   TBD    Do you have help at home or someone to help you manage your care at home? Yes     Who are your caregiver(s) and their phone number(s)? Shahbaz (Son) [phone] (Mobile)     Prior to hospitilization cognitive  status: Unable to Assess     Current cognitive status: Unable to Assess     Dressing/Bathing bathing difficulty, dependent;dressing difficulty, dependent   The patient's son expressed that the patient was getting a bed bath.    Home Layout Able to live on 1st floor     Equipment Currently Used at Home hospital bed;oxygen     Readmission within 30 days? No     Patient currently being followed by outpatient case management? No     Do you currently have service(s) that help you manage your care at home? Yes     Name and Contact number of agency [facility]     Is the pt/caregiver preference to resume services with current agency Yes     Who is going to help you get home at discharge? TBD     How do you get to doctors appointments? other (see comments)   TBD    Are you on dialysis? No     DME Needed Upon Discharge  other (see comments)   TBD    Discharge Plan discussed with: Patient     Discharge Plan A New Nursing Home placement - [facility] care facility;Hospice/home     Discharge Plan B Other   TBD                Initial discharge assessment is completed. Spoke to the patient's son, Shahbaz, via phone. The patient currently lives with her son, Shahbaz. Shahbaz is also the patient's caregiver. The patient was receiving hospice care prior to being to the hospital.    Shahbaz, expressed that the patient does require assistance with her ADLs. She also has equipment at home. I had a discussion with Shahbaz regarding the patient's discharge plan of care, and he expressed that he is working with his siblings to get the patient placed at a nursing facility. CM/SW will remain available. Contact information was left in the patient's room.

## 2023-11-14 NOTE — PROGRESS NOTES
Pharmacist Renal Dose Adjustment Note    Nan Holman is a 89 y.o. female being treated with the medication famotidine    Patient Data:    Vital Signs (Most Recent):  Temp: 99.1 °F (37.3 °C) (11/14/23 0809)  Pulse: 82 (11/14/23 0809)  Resp: (!) 22 (11/14/23 0809)  BP: (!) 162/71 (11/14/23 0809)  SpO2: 96 % (11/14/23 0809) Vital Signs (72h Range):  Temp:  [96.8 °F (36 °C)-100 °F (37.8 °C)]   Pulse:  [77-93]   Resp:  [17-36]   BP: (114-162)/(58-71)   SpO2:  [93 %-100 %]      Recent Labs   Lab 11/12/23  0516 11/13/23  1128 11/14/23  0612   CREATININE 0.8 0.6 0.6     Serum creatinine: 0.6 mg/dL 11/14/23 0612  Estimated creatinine clearance: 56.1 mL/min    Medication: famotidine dose:  20 mg frequency  QD will be changed to medication: famotidine dose: 20 mg frequency:BID    Pharmacist's Name: Gino Morel  Pharmacist's Extension: [phone]

## 2023-11-14 NOTE — PROGRESS NOTES
[facility] [place] [facility]  Progress Note    Patient Name: Nan Holman  MRN: [id number]  Patient Class: IP- Inpatient   Admission Date: 11/11/2023  Length of Stay: 3 days  Attending Physician: Joie Reid MD  Primary Care Provider: Joie Reid MD        Subjective:     Principal Problem:Acute cystitis without hematuria        HPI:  Patient 89-year-old female history of GERD, type 2 diabetes, hypothyroidism, dementia, hyperlipidemia patient ED from hospice care after not being arousable, ED physician talked to PCP, patient was found to have UTI, patient was admitted for IV antibiotics, plan to discharge back to hospice once stable    Overview/Hospital Course:  11/13 GT:  Patient is lying in bed this morning, lethargic, minimally responsive to verbal stimuli.  Patient noted with mucopurulent drainage from right eye, this was cleaned during assessment, requested nursing finish cleaning her eye.  Vigamox dropped ordered for treatment.  Patient's urine culture demonstrates aerococcus urinae, awaiting sensitivity report.  Blood cultures are negative to date.  Patient is already on hospice services and will return home with hospice once UTI is treated appropriately.  Labs were not drawn this morning, orders have been entered, awaiting results.    11/14 GT:  Patient remains lethargic but will arouse to tactile stimuli.  No sensitivity to urine culture due to organism having difficulty growing on the media disc.  Our staff pharmacist did speak with Infectious Disease in the [place], antibiotic recommendations made.  We will switch patient's antibiotics to ampicillin today.  We will give a 1 time dose of Lasix due to edema to bilateral upper extremities, lower extremities remain without edema.  Patient remains hypokalemic, we will start oral potassium replacement and switch IV fluids to D5 half-normal saline with KCl 20 mEq at 50 an hour.  Blood cultures remain negative to  date.    Past Medical History:   Diagnosis Date    Anemia, unspecified     Anxiety disorder, unspecified     Asthma     Carpal tunnel syndrome     Chills     Constipation     Dementia     Depression     Dermatosis     Diabetes mellitus out of control     Diabetes mellitus type 2, controlled     Diarrhea     Dizziness     Gastroenteritis     Gastroesophageal reflux     GI hemorrhage     Heartburn     HTN (hypertension)     Hypothyroidism, unspecified     Impaction of the bowels     Irritable bowel syndrome     Major depressive disorder, single episode, unspecified     Malaise and fatigue     Melena     Mild intermittent asthma, uncomplicated     Mixed hyperlipidemia     Nausea with vomiting     Osteopenia     Rheumatoid arthritis, unspecified     Rosacea     Sleep apnea, unspecified     Unspecified macular degeneration     Unspecified osteoarthritis, unspecified site     Vitamin D deficiency     Weakness        Past Surgical History:   Procedure Laterality Date    CARPAL TUNNEL RELEASE  2006    lt. hand    CATARACT EXTRACTION  2006    CHOLECYSTECTOMY      COLONOSCOPY  1997    COSMETIC SURGERY      FACE LIFT    HEMORRHOID SURGERY      RHINOPLASTY         Review of patient's allergies indicates:   Allergen Reactions    Sulfamethoxazole-trimethoprim Hives     Other reaction(s): hives       No current facility-administered medications on file prior to encounter.     Current Outpatient Medications on File Prior to Encounter   Medication Sig    cholecalciferol, vitamin D3, (VITAMIN D3) 50 mcg (2,000 unit) Cap capsule 1 capsule.    esomeprazole (NEXIUM) 40 MG capsule TAKE 1 CAPSULE BY MOUTH EVERY DAY BEFORE BREAKFAST.    LANTUS SOLOSTAR U-100 INSULIN glargine 100 units/mL SubQ pen INJECT 60 UNITS SUBCUTANEOUSLY ONCE DAILY    levothyroxine (SYNTHROID) 75 MCG tablet Take 1 tablet by mouth once daily    meclizine (ANTIVERT) 25 mg tablet TAKE 1 TABLET BY MOUTH THREE TIMES DAILY AS NEEDED FOR DIZZINESS    memantine (NAMENDA) 10  "MG Tab Take 1 tablet by mouth twice daily    metoclopramide HCl (REGLAN) 5 MG tablet TAKE 1 TABLET BY MOUTH 4 TIMES DAILY 30 MINUTES BEFORE  MEALS  AND  AT  BEDTIME    multivit-min/iron/folic/lutein (CENTRUM SILVER WOMEN ORAL) Take by mouth.    pioglitazone (ACTOS) 30 MG tablet Take 1 tablet by mouth once daily    QUEtiapine (SEROQUEL) 100 MG Tab TAKE 1 TABLET BY MOUTH ONCE DAILY AT BEDTIME    simvastatin (ZOCOR) 40 MG tablet TAKE 1 TABLET BY MOUTH ONCE DAILY IN THE EVENING    valsartan-hydrochlorothiazide (DIOVAN-HCT) 80-12.5 mg per tablet Take 1 tablet by mouth once daily    venlafaxine (EFFEXOR-XR) 150 MG Cp24 TAKE 1 CAPSULE BY MOUTH EVERY DAY    vit A/vit C/vit E/zinc/copper (PRESERVISION AREDS ORAL) Take by mouth.    vitamin E 400 UNIT capsule 1 capsule.    BINAXNOW COVID-19 AG SELF TEST Kit Use as Directed on the Package    iron kfrsol-R-P46-Ca-suc-stoma (MULTIGEN) 70 mg-150 mg-10 mcg-2 mg-75 mg Tab tablet TAKE 1 TABLET BY MOUTH EVERY DAY    pen needle, diabetic 32 gauge x 5/32" Ndle 1 applicator by Misc.(Non-Drug; Combo Route) route once daily.     Family History       Problem Relation (Age of Onset)    Coronary artery disease Father, Brother    Diabetes Mellitus Brother, Paternal Grandmother          Tobacco Use    Smoking status: Former     Current packs/day: 1.00     Types: Cigarettes    Smokeless tobacco: Never    Tobacco comments:     started age 18 stopped age 55   Substance and Sexual Activity    Alcohol use: Not Currently    Drug use: Never    Sexual activity: Not on file     Review of Systems   Unable to perform ROS: Dementia     Objective:     Vital Signs (Most Recent):  Temp: 99.1 °F (37.3 °C) (11/14/23 0809)  Pulse: 82 (11/14/23 0809)  Resp: (!) 22 (11/14/23 0809)  BP: (!) 162/71 (11/14/23 0809)  SpO2: 96 % (11/14/23 0809) Vital Signs (24h Range):  Temp:  [99.1 °F (37.3 °C)-100 °F (37.8 °C)] 99.1 °F (37.3 °C)  Pulse:  [80-85] 82  Resp:  [17-22] 22  SpO2:  [94 %-97 %] 96 %  BP: (114-162)/(59-71) " 162/71     Weight: 73.9 kg (162 lb 14.4 oz)  Body mass index is 30.78 kg/m².     Physical Exam  Vitals and nursing note reviewed.   Constitutional:       General: She is not in acute distress.     Appearance: She is well-developed. She is ill-appearing. She is not diaphoretic.      Comments: Frail, chronically ill-appearing   HENT:      Head: Normocephalic and atraumatic.      Nose: No congestion or rhinorrhea.      Mouth/Throat:      Mouth: Mucous membranes are moist.      Pharynx: Oropharynx is clear. No oropharyngeal exudate or posterior oropharyngeal erythema.   Eyes:      General: No scleral icterus.        Right eye: Discharge present.         Left eye: No discharge.      Extraocular Movements: Extraocular movements intact.      Conjunctiva/sclera:      Right eye: Exudate present.   Cardiovascular:      Rate and Rhythm: Normal rate and regular rhythm.      Pulses: Normal pulses.      Heart sounds: Normal heart sounds. No murmur heard.     No friction rub. No gallop.   Pulmonary:      Effort: Pulmonary effort is normal. No respiratory distress.      Breath sounds: No stridor. No wheezing, rhonchi or rales.   Chest:      Chest wall: No tenderness.   Abdominal:      General: Bowel sounds are normal. There is no distension.      Palpations: Abdomen is soft.      Tenderness: There is no abdominal tenderness. There is no guarding.   Musculoskeletal:         General: Swelling present.      Cervical back: Normal range of motion and neck supple.      Right lower leg: No edema.      Left lower leg: No edema.      Comments: Edema to upper extremities   Skin:     General: Skin is warm and dry.      Capillary Refill: Capillary refill takes less than 2 seconds.      Coloration: Skin is pale.   Neurological:      Mental Status: She is lethargic.      Motor: Weakness (generalized) present.   Psychiatric:         Mood and Affect: Mood normal.         Behavior: Behavior normal.         Thought Content: Thought content normal.                 Significant Labs: All pertinent labs within the past 24 hours have been reviewed.      Recent Lab Results         11/14/23  0612   11/13/23  1631   11/13/23  1128   11/13/23  1010        Albumin 1.8     1.9         ALP 85     87         ALT 20     17         Anion Gap 8     7         AST 30     27         Baso # 0.02     0.03         Basophil % 0.3     0.4         BILIRUBIN TOTAL 0.4  Comment: For infants and newborns, interpretation of results should be based  on gestational age, weight and in agreement with clinical  observations.    Premature Infant recommended reference ranges:  Up to 24 hours.............<8.0 mg/dL  Up to 48 hours............<12.0 mg/dL  3-5 days..................<15.0 mg/dL  6-29 days.................<15.0 mg/dL    For patients on Eltrombopag therapy, use of Dimension [place] TBIL is   not   recommended.       0.4  Comment: For infants and newborns, interpretation of results should be based  on gestational age, weight and in agreement with clinical  observations.    Premature Infant recommended reference ranges:  Up to 24 hours.............<8.0 mg/dL  Up to 48 hours............<12.0 mg/dL  3-5 days..................<15.0 mg/dL  6-29 days.................<15.0 mg/dL    For patients on Eltrombopag therapy, use of Dimension [place] TBIL is   not   recommended.           BUN 9     10         Calcium 8.0     7.9         Chloride 113     112         CO2 21     22         Creatinine 0.6     0.6         Differential Method Automated     Automated         eGFR >60.0     >60.0         Eos # 0.1     0.0         Eosinophil % 0.8     0.5         Glucose 147     143         Gran # (ANC) 6.2     6.6         Gran % 83.5     83.2         Hematocrit 31.3     30.1         Hemoglobin 10.4     10.0         Immature Grans (Abs) 0.05  Comment: Mild elevation in immature granulocytes is non specific and   can be seen in a variety of conditions including stress response,   acute inflammation, trauma and  pregnancy. Correlation with other   laboratory and clinical findings is essential.       0.04  Comment: Mild elevation in immature granulocytes is non specific and   can be seen in a variety of conditions including stress response,   acute inflammation, trauma and pregnancy. Correlation with other   laboratory and clinical findings is essential.           Immature Granulocytes 0.7     0.5         Lymph # 0.5     0.6         Lymph % 6.8     7.4         Magnesium  1.9     2.0         MCH 31.1     31.2         MCHC 33.2     33.2         MCV 94     94         Mono # 0.6     0.6         Mono % 7.9     8.0         MPV 10.6     10.9         nRBC 0     0         Platelet Count 182     195         POCT Glucose   179     151       Poly Occasional             Potassium 3.3     3.2         PROTEIN TOTAL 5.8     5.9         RBC 3.34     3.21         RDW 14.4     14.3         Sodium 142     141         WBC 7.36     7.89                 Significant Imaging: I have reviewed all pertinent imaging results/findings within the past 24 hours.    Assessment/Plan:      * Acute cystitis without hematuria  Patient on hospice services, was brought in to the ED with suspicion of stroke symptoms, was found to have UTI, patient was admitted for IV antibiotic therapy, no leukocytosis, afebrile currently, follow-up urine culture report, continue IV Rocephin.    11/13 GT:  Urine culture demonstrates aerococcus urinae, awaiting sensitivity report.  Continue IV Rocephin.  Awaiting morning labs.    11/14 GT: DC Rocephin, begin Ampicillin.      Hypokalemia  Patient has hypokalemia which is Acute and currently controlled. Most recent potassium levels reviewed-   Lab Results   Component Value Date    K 3.3 (L) 11/14/2023     Start d5 1/2 NS with Kcl 20 mEq at 50 mL/hr. Monitor with daily labs.     Acute bacterial conjunctivitis of right eye  Vigamox t.i.d..  Clean eye p.r.n..      Dementia  Continue Namenda    Diabetes mellitus without  complication  Patient's FSGs are controlled on current medication regimen.  Last A1c reviewed-   Lab Results   Component Value Date    HGBA1C 4.8 11/11/2023    HGBA1C 4.8 11/11/2023     Most recent fingerstick glucose reviewed-   Recent Labs   Lab 11/13/23  1010 11/13/23  1631   POCTGLUCOSE 151* 179*       Current correctional scale  Low  Maintain anti-hyperglycemic dose as follows-   Antihyperglycemics (From admission, onward)      Start     Stop Route Frequency Ordered    11/11/23 1624  insulin aspart U-100 pen 0-5 Units         -- SubQ Before meals & nightly PRN 11/11/23 1611          Hold Oral hypoglycemics while patient is in the hospital.    Hypothyroidism  Continue levothyroxine 75 mcg daily      Depression, major, recurrent  Continue Effexor and Seroquel        Benign essential hypertension  Chronic, controlled. Latest blood pressure and vitals reviewed-     Temp:  [99.1 °F (37.3 °C)-100 °F (37.8 °C)]   Pulse:  [80-85]   Resp:  [17-22]   BP: (114-162)/(59-71)   SpO2:  [94 %-97 %] .   Home meds for hypertension were reviewed and noted below.   Hypertension Medications               valsartan-hydrochlorothiazide (DIOVAN-HCT) 80-12.5 mg per tablet Take 1 tablet by mouth once daily            While in the hospital, will manage blood pressure as follows; Continue home antihypertensive regimen    Will utilize p.r.n. blood pressure medication only if patient's blood pressure greater than 160/100 and she develops symptoms such as worsening chest pain or shortness of breath.    Arthropathy          VTE Risk Mitigation (From admission, onward)           Ordered     IP VTE HIGH RISK PATIENT  Once         11/11/23 1611     Place sequential compression device  Until discontinued         11/11/23 1611     Place BREANA hose  Until discontinued         11/11/23 1611                    Discharge Planning   SANDRA:      Code Status: DNR   Is the patient medically ready for discharge?:     Reason for patient still in hospital  (select all that apply): Patient trending condition, Treatment, and Pending disposition  Discharge Plan A: New Nursing Home placement - intermediate care facility, Hospice/home                  WALT VALENCIA  Department of Hospital Medicine   [facility]

## 2023-11-15 LAB
ALBUMIN SERPL BCP-MCNC: 1.9 G/DL (ref 3.5–5.2)
ALP SERPL-CCNC: 83 U/L (ref 55–135)
ALT SERPL W/O P-5'-P-CCNC: 22 U/L (ref 10–44)
ANION GAP SERPL CALC-SCNC: 5 MMOL/L (ref 3–11)
AST SERPL-CCNC: 21 U/L (ref 10–40)
BASOPHILS # BLD AUTO: 0.03 K/UL (ref 0–0.2)
BASOPHILS NFR BLD: 0.3 % (ref 0–1.9)
BILIRUB SERPL-MCNC: 0.4 MG/DL (ref 0.1–1)
BUN SERPL-MCNC: 11 MG/DL (ref 10–30)
CALCIUM SERPL-MCNC: 8 MG/DL (ref 8.7–10.5)
CHLORIDE SERPL-SCNC: 112 MMOL/L (ref 95–110)
CO2 SERPL-SCNC: 24 MMOL/L (ref 23–29)
CREAT SERPL-MCNC: 0.6 MG/DL (ref 0.5–1.4)
DIFFERENTIAL METHOD: ABNORMAL
EOSINOPHIL # BLD AUTO: 0.1 K/UL (ref 0–0.5)
EOSINOPHIL NFR BLD: 1 % (ref 0–8)
ERYTHROCYTE [DISTWIDTH] IN BLOOD BY AUTOMATED COUNT: 14.2 % (ref 11.5–14.5)
EST. GFR  (NO RACE VARIABLE): >60 ML/MIN/1.73 M^2
GLUCOSE SERPL-MCNC: 208 MG/DL (ref 70–110)
HCT VFR BLD AUTO: 31.8 % (ref 37–48.5)
HGB BLD-MCNC: 10.4 G/DL (ref 12–16)
IMM GRANULOCYTES # BLD AUTO: 0.05 K/UL (ref 0–0.04)
IMM GRANULOCYTES NFR BLD AUTO: 0.5 % (ref 0–0.5)
LYMPHOCYTES # BLD AUTO: 0.4 K/UL (ref 1–4.8)
LYMPHOCYTES NFR BLD: 3.9 % (ref 18–48)
MAGNESIUM SERPL-MCNC: 1.8 MG/DL (ref 1.6–2.6)
MCH RBC QN AUTO: 30.4 PG (ref 27–31)
MCHC RBC AUTO-ENTMCNC: 32.7 G/DL (ref 32–36)
MCV RBC AUTO: 93 FL (ref 82–98)
MONOCYTES # BLD AUTO: 0.7 K/UL (ref 0.3–1)
MONOCYTES NFR BLD: 7.7 % (ref 4–15)
NEUTROPHILS # BLD AUTO: 8.3 K/UL (ref 1.8–7.7)
NEUTROPHILS NFR BLD: 86.6 % (ref 38–73)
NRBC BLD-RTO: 0 /100 WBC
PLATELET # BLD AUTO: 195 K/UL (ref 150–450)
PMV BLD AUTO: 10.4 FL (ref 9.2–12.9)
POCT GLUCOSE: 154 MG/DL (ref 70–110)
POCT GLUCOSE: 185 MG/DL (ref 70–110)
POTASSIUM SERPL-SCNC: 2.8 MMOL/L (ref 3.5–5.1)
PROT SERPL-MCNC: 6 G/DL (ref 6–8.4)
RBC # BLD AUTO: 3.42 M/UL (ref 4–5.4)
SODIUM SERPL-SCNC: 141 MMOL/L (ref 136–145)
WBC # BLD AUTO: 9.56 K/UL (ref 3.9–12.7)

## 2023-11-15 PROCEDURE — 63600175 PHARM REV CODE 636 W HCPCS

## 2023-11-15 PROCEDURE — 86580 TB INTRADERMAL TEST: CPT

## 2023-11-15 PROCEDURE — 25000003 PHARM REV CODE 250: Performed by: INTERNAL MEDICINE

## 2023-11-15 PROCEDURE — 80053 COMPREHEN METABOLIC PANEL: CPT

## 2023-11-15 PROCEDURE — 30200315 PPD INTRADERMAL TEST REV CODE 302

## 2023-11-15 PROCEDURE — 11000001 HC ACUTE MED/SURG PRIVATE ROOM

## 2023-11-15 PROCEDURE — 36415 COLL VENOUS BLD VENIPUNCTURE: CPT

## 2023-11-15 PROCEDURE — 83735 ASSAY OF MAGNESIUM: CPT

## 2023-11-15 PROCEDURE — 25000003 PHARM REV CODE 250

## 2023-11-15 PROCEDURE — 85025 COMPLETE CBC W/AUTO DIFF WBC: CPT

## 2023-11-15 RX ORDER — POTASSIUM CHLORIDE 7.45 MG/ML
10 INJECTION INTRAVENOUS
Status: COMPLETED | OUTPATIENT
Start: 2023-11-15 | End: 2023-11-15

## 2023-11-15 RX ORDER — FUROSEMIDE 10 MG/ML
20 INJECTION INTRAMUSCULAR; INTRAVENOUS ONCE
Status: COMPLETED | OUTPATIENT
Start: 2023-11-15 | End: 2023-11-15

## 2023-11-15 RX ADMIN — FAMOTIDINE 20 MG: 10 INJECTION, SOLUTION INTRAVENOUS at 09:11

## 2023-11-15 RX ADMIN — POTASSIUM CHLORIDE 10 MEQ: 7.46 INJECTION, SOLUTION INTRAVENOUS at 09:11

## 2023-11-15 RX ADMIN — POTASSIUM CHLORIDE 10 MEQ: 7.46 INJECTION, SOLUTION INTRAVENOUS at 11:11

## 2023-11-15 RX ADMIN — DEXTROSE, SODIUM CHLORIDE, AND POTASSIUM CHLORIDE: 5; .45; .15 INJECTION INTRAVENOUS at 08:11

## 2023-11-15 RX ADMIN — AMPICILLIN SODIUM 1 G: 1 INJECTION, POWDER, FOR SOLUTION INTRAMUSCULAR; INTRAVENOUS at 10:11

## 2023-11-15 RX ADMIN — AMPICILLIN SODIUM 1 G: 1 INJECTION, POWDER, FOR SOLUTION INTRAMUSCULAR; INTRAVENOUS at 04:11

## 2023-11-15 RX ADMIN — AMPICILLIN SODIUM 1 G: 1 INJECTION, POWDER, FOR SOLUTION INTRAMUSCULAR; INTRAVENOUS at 09:11

## 2023-11-15 RX ADMIN — FUROSEMIDE 20 MG: 10 INJECTION, SOLUTION INTRAMUSCULAR; INTRAVENOUS at 10:11

## 2023-11-15 RX ADMIN — MOXIFLOXACIN 1 DROP: 5 SOLUTION/ DROPS OPHTHALMIC at 03:11

## 2023-11-15 RX ADMIN — MOXIFLOXACIN 1 DROP: 5 SOLUTION/ DROPS OPHTHALMIC at 09:11

## 2023-11-15 RX ADMIN — TUBERCULIN PURIFIED PROTEIN DERIVATIVE 5 UNITS: 5 INJECTION, SOLUTION INTRADERMAL at 10:11

## 2023-11-15 RX ADMIN — AMPICILLIN SODIUM 1 G: 1 INJECTION, POWDER, FOR SOLUTION INTRAMUSCULAR; INTRAVENOUS at 03:11

## 2023-11-15 RX ADMIN — FAMOTIDINE 20 MG: 10 INJECTION, SOLUTION INTRAVENOUS at 08:11

## 2023-11-15 NOTE — ASSESSMENT & PLAN NOTE
Chronic, controlled. Latest blood pressure and vitals reviewed-     Temp:  [97.7 °F (36.5 °C)-99 °F (37.2 °C)]   Pulse:  [76-85]   Resp:  [17-20]   BP: (126-194)/(66-84)   SpO2:  [93 %-97 %] .   Home meds for hypertension were reviewed and noted below.   Hypertension Medications               valsartan-hydrochlorothiazide (DIOVAN-HCT) 80-12.5 mg per tablet Take 1 tablet by mouth once daily            While in the hospital, will manage blood pressure as follows; Continue home antihypertensive regimen    Will utilize p.r.n. blood pressure medication only if patient's blood pressure greater than 160/100 and she develops symptoms such as worsening chest pain or shortness of breath.    11/15 GT: Will give an additional dose of diuresis today, dc IVF.

## 2023-11-15 NOTE — PLAN OF CARE
11/15/23 1509   Medicare Message   Important Message from Medicare regarding Discharge Appeal Rights Given to patient/caregiver;Explained to patient/caregiver;Signed/date by patient/caregiver  (verbal auth from Yonis Holman (son))   Date IMM was signed 11/15/23   Time IMM was signed 0928

## 2023-11-15 NOTE — ASSESSMENT & PLAN NOTE
Patient has hypokalemia which is Acute and currently controlled. Most recent potassium levels reviewed-   Lab Results   Component Value Date    K 2.8 (L) 11/15/2023     Start d5 1/2 NS with Kcl 20 mEq at 50 mL/hr. Monitor with daily labs.     11/15 GT: DC IVF due to worsening third spacing, will give K rider today.

## 2023-11-15 NOTE — PROGRESS NOTES
[facility] Medicine  Progress Note    Patient Name: Nan Holman  MRN: [id number]  Patient Class: IP- Inpatient   Admission Date: 11/11/2023  Length of Stay: 4 days  Attending Physician: Joie Reid MD  Primary Care Provider: Joie Reid MD        Subjective:     Principal Problem:Acute cystitis without hematuria        HPI:  Patient 89-year-old female history of GERD, type 2 diabetes, hypothyroidism, dementia, hyperlipidemia patient ED from hospice care after not being arousable, ED physician talked to PCP, patient was found to have UTI, patient was admitted for IV antibiotics, plan to discharge back to hospice once stable    Overview/Hospital Course:  11/13 GT:  Patient is lying in bed this morning, lethargic, minimally responsive to verbal stimuli.  Patient noted with mucopurulent drainage from right eye, this was cleaned during assessment, requested nursing finish cleaning her eye.  Vigamox dropped ordered for treatment.  Patient's urine culture demonstrates aerococcus urinae, awaiting sensitivity report.  Blood cultures are negative to date.  Patient is already on hospice services and will return home with hospice once UTI is treated appropriately.  Labs were not drawn this morning, orders have been entered, awaiting results.    11/14 GT:  Patient remains lethargic but will arouse to tactile stimuli.  No sensitivity to urine culture due to organism having difficulty growing on the media disc.  Our staff pharmacist did speak with Infectious Disease in the [facility], antibiotic recommendations made.  We will switch patient's antibiotics to ampicillin today.  We will give a 1 time dose of Lasix due to edema to bilateral upper extremities, lower extremities remain without edema.  Patient remains hypokalemic, we will start oral potassium replacement and switch IV fluids to D5 half-normal saline with KCl 20 mEq at 50 an hour.  Blood cultures remain negative to  date.    11/15 GT:  Patient remains lethargic, will open her eyes for a few seconds, but immediately closes them.  Patient not verbally responsive.  Son at bedside and we have discussed that patient appears to be end-stage with 3rd spacing, no p.o. intake.  Son reports who prefer to continue hospice care at a nursing home in [place] due to this being closer to them.  Case management consult for nursing home placement entered, ppd panel entered, patient had a chest x-ray on admission.  We will give additional dose of diuresis today and discontinue IV fluids.  We will give 20 mEq potassium rider.    Past Medical History:   Diagnosis Date    Anemia, unspecified     Anxiety disorder, unspecified     Asthma     Carpal tunnel syndrome     Chills     Constipation     Dementia     Depression     Dermatosis     Diabetes mellitus out of control     Diabetes mellitus type 2, controlled     Diarrhea     Dizziness     Gastroenteritis     Gastroesophageal reflux     GI hemorrhage     Heartburn     HTN (hypertension)     Hypothyroidism, unspecified     Impaction of the bowels     Irritable bowel syndrome     Major depressive disorder, single episode, unspecified     Malaise and fatigue     Melena     Mild intermittent asthma, uncomplicated     Mixed hyperlipidemia     Nausea with vomiting     Osteopenia     Rheumatoid arthritis, unspecified     Rosacea     Sleep apnea, unspecified     Unspecified macular degeneration     Unspecified osteoarthritis, unspecified site     Vitamin D deficiency     Weakness        Past Surgical History:   Procedure Laterality Date    CARPAL TUNNEL RELEASE  2006    lt. hand    CATARACT EXTRACTION  2006    CHOLECYSTECTOMY      COLONOSCOPY  1997    COSMETIC SURGERY      FACE LIFT    HEMORRHOID SURGERY      RHINOPLASTY         Review of patient's allergies indicates:   Allergen Reactions    Sulfamethoxazole-trimethoprim Hives     Other reaction(s): hives       No current facility-administered medications  "on file prior to encounter.     Current Outpatient Medications on File Prior to Encounter   Medication Sig    cholecalciferol, vitamin D3, (VITAMIN D3) 50 mcg (2,000 unit) Cap capsule 1 capsule.    esomeprazole (NEXIUM) 40 MG capsule TAKE 1 CAPSULE BY MOUTH EVERY DAY BEFORE BREAKFAST.    LANTUS SOLOSTAR U-100 INSULIN glargine 100 units/mL SubQ pen INJECT 60 UNITS SUBCUTANEOUSLY ONCE DAILY    levothyroxine (SYNTHROID) 75 MCG tablet Take 1 tablet by mouth once daily    meclizine (ANTIVERT) 25 mg tablet TAKE 1 TABLET BY MOUTH THREE TIMES DAILY AS NEEDED FOR DIZZINESS    memantine (NAMENDA) 10 MG Tab Take 1 tablet by mouth twice daily    metoclopramide HCl (REGLAN) 5 MG tablet TAKE 1 TABLET BY MOUTH 4 TIMES DAILY 30 MINUTES BEFORE  MEALS  AND  AT  BEDTIME    multivit-min/iron/folic/lutein (CENTRUM SILVER WOMEN ORAL) Take by mouth.    pioglitazone (ACTOS) 30 MG tablet Take 1 tablet by mouth once daily    QUEtiapine (SEROQUEL) 100 MG Tab TAKE 1 TABLET BY MOUTH ONCE DAILY AT BEDTIME    simvastatin (ZOCOR) 40 MG tablet TAKE 1 TABLET BY MOUTH ONCE DAILY IN THE EVENING    valsartan-hydrochlorothiazide (DIOVAN-HCT) 80-12.5 mg per tablet Take 1 tablet by mouth once daily    venlafaxine (EFFEXOR-XR) 150 MG Cp24 TAKE 1 CAPSULE BY MOUTH EVERY DAY    vit A/vit C/vit E/zinc/copper (PRESERVISION AREDS ORAL) Take by mouth.    vitamin E 400 UNIT capsule 1 capsule.    BINAXNOW COVID-19 AG SELF TEST Kit Use as Directed on the Package    iron [id number]-suc-stoma (MULTIGEN) 70 mg-150 mg-10 mcg-2 mg-75 mg Tab tablet TAKE 1 TABLET BY MOUTH EVERY DAY    pen needle, diabetic 32 gauge x 5/32" Ndle 1 applicator by Misc.(Non-Drug; Combo Route) route once daily.     Family History       Problem Relation (Age of Onset)    Coronary artery disease Father, Brother    Diabetes Mellitus Brother, Paternal Grandmother          Tobacco Use    Smoking status: Former     Current packs/day: 1.00     Types: Cigarettes    Smokeless tobacco: Never    " Tobacco comments:     started age 18 stopped age 55   Substance and Sexual Activity    Alcohol use: Not Currently    Drug use: Never    Sexual activity: Not on file     Review of Systems   Unable to perform ROS: Dementia     Objective:     Vital Signs (Most Recent):  Temp: 99 °F (37.2 °C) (11/15/23 0517)  Pulse: 84 (11/15/23 0517)  Resp: 20 (11/15/23 0517)  BP: 126/66 (11/15/23 0517)  SpO2: (!) 93 % (11/15/23 0517) Vital Signs (24h Range):  Temp:  [97.7 °F (36.5 °C)-99 °F (37.2 °C)] 99 °F (37.2 °C)  Pulse:  [76-85] 84  Resp:  [17-20] 20  SpO2:  [93 %-97 %] 93 %  BP: (126-194)/(66-84) 126/66     Weight: 73.9 kg (162 lb 14.4 oz)  Body mass index is 30.78 kg/m².     Physical Exam  Vitals and nursing note reviewed.   Constitutional:       General: She is not in acute distress.     Appearance: She is ill-appearing. She is not diaphoretic.      Comments: Frail, chronically ill-appearing   HENT:      Head: Normocephalic and atraumatic.      Nose: No congestion or rhinorrhea.      Mouth/Throat:      Mouth: Mucous membranes are moist.      Pharynx: Oropharynx is clear. No oropharyngeal exudate or posterior oropharyngeal erythema.   Eyes:      General: No scleral icterus.        Right eye: Discharge present.         Left eye: No discharge.      Extraocular Movements: Extraocular movements intact.      Conjunctiva/sclera:      Right eye: Exudate present.   Cardiovascular:      Rate and Rhythm: Normal rate and regular rhythm.      Pulses: Normal pulses.      Heart sounds: Normal heart sounds. No murmur heard.     No friction rub. No gallop.   Pulmonary:      Effort: Pulmonary effort is normal. No respiratory distress.      Breath sounds: No stridor. No wheezing, rhonchi or rales.   Chest:      Chest wall: No tenderness.   Abdominal:      General: Bowel sounds are normal. There is no distension.      Palpations: Abdomen is soft.      Tenderness: There is no abdominal tenderness. There is no guarding.   Musculoskeletal:          General: Swelling present.      Cervical back: Normal range of motion and neck supple.      Right lower leg: No edema.      Left lower leg: No edema.      Comments: Edema to upper extremities   Skin:     General: Skin is warm and dry.      Capillary Refill: Capillary refill takes less than 2 seconds.      Coloration: Skin is pale.   Neurological:      Mental Status: She is lethargic.      Motor: Weakness (generalized) present.   Psychiatric:         Mood and Affect: Mood normal.         Behavior: Behavior normal.         Thought Content: Thought content normal.                Significant Labs: All pertinent labs within the past 24 hours have been reviewed.      Recent Lab Results         11/15/23  0508   11/14/23  1757   11/14/23  1242        Albumin 1.9           ALP 83           ALT 22           Anion Gap 5           AST 21           Baso # 0.03           Basophil % 0.3           BILIRUBIN TOTAL 0.4  Comment: For infants and newborns, interpretation of results should be based  on gestational age, weight and in agreement with clinical  observations.    Premature Infant recommended reference ranges:  Up to 24 hours.............<8.0 mg/dL  Up to 48 hours............<12.0 mg/dL  3-5 days..................<15.0 mg/dL  6-29 days.................<15.0 mg/dL    For patients on Eltrombopag therapy, use of Dimension [place] TBIL is   not   recommended.             BUN 11           Calcium 8.0           Chloride 112           CO2 24           Creatinine 0.6           Differential Method Automated           eGFR >60.0           Eos # 0.1           Eosinophil % 1.0           Glucose 208           Gran # (ANC) 8.3           Gran % 86.6           Hematocrit 31.8           Hemoglobin 10.4           Immature Grans (Abs) 0.05  Comment: Mild elevation in immature granulocytes is non specific and   can be seen in a variety of conditions including stress response,   acute inflammation, trauma and pregnancy. Correlation with other    laboratory and clinical findings is essential.             Immature Granulocytes 0.5           Lymph # 0.4           Lymph % 3.9           Magnesium  1.8           MCH 30.4           MCHC 32.7           MCV 93           Mono # 0.7           Mono % 7.7           MPV 10.4           nRBC 0           Platelet Count 195           POCT Glucose   241   201       Potassium 2.8           PROTEIN TOTAL 6.0           RBC 3.42           RDW 14.2           Sodium 141           WBC 9.56                   Significant Imaging: I have reviewed all pertinent imaging results/findings within the past 24 hours.    Assessment/Plan:      * Acute cystitis without hematuria  Patient on hospice services, was brought in to the ED with suspicion of stroke symptoms, was found to have UTI, patient was admitted for IV antibiotic therapy, no leukocytosis, afebrile currently, follow-up urine culture report, continue IV Rocephin.    11/13 GT:  Urine culture demonstrates aerococcus urinae, awaiting sensitivity report.  Continue IV Rocephin.  Awaiting morning labs.    11/14 GT: DC Rocephin, begin Ampicillin.    11/15 GT: Continue Ampicillin.      Hypokalemia  Patient has hypokalemia which is Acute and currently controlled. Most recent potassium levels reviewed-   Lab Results   Component Value Date    K 2.8 (L) 11/15/2023     Start d5 1/2 NS with Kcl 20 mEq at 50 mL/hr. Monitor with daily labs.     11/15 GT: DC IVF due to worsening third spacing, will give K rider today.     Acute bacterial conjunctivitis of right eye  Vigamox t.i.d..  Clean eye p.r.n..      Dementia  Continue Namenda    Diabetes mellitus without complication  Patient's FSGs are controlled on current medication regimen.  Last A1c reviewed-   Lab Results   Component Value Date    HGBA1C 4.8 11/11/2023    HGBA1C 4.8 11/11/2023     Most recent fingerstick glucose reviewed-   Recent Labs   Lab 11/14/23  1242 11/14/23  1757   POCTGLUCOSE 201* 241*       Current correctional scale   Low  Maintain anti-hyperglycemic dose as follows-   Antihyperglycemics (From admission, onward)      Start     Stop Route Frequency Ordered    11/11/23 1624  insulin aspart U-100 pen 0-5 Units         -- SubQ Before meals & nightly PRN 11/11/23 1611          Hold Oral hypoglycemics while patient is in the hospital.    Hypothyroidism  Continue levothyroxine 75 mcg daily      Depression, major, recurrent  Continue Effexor and Seroquel        Benign essential hypertension  Chronic, controlled. Latest blood pressure and vitals reviewed-     Temp:  [97.7 °F (36.5 °C)-99 °F (37.2 °C)]   Pulse:  [76-85]   Resp:  [17-20]   BP: (126-194)/(66-84)   SpO2:  [93 %-97 %] .   Home meds for hypertension were reviewed and noted below.   Hypertension Medications               valsartan-hydrochlorothiazide (DIOVAN-HCT) 80-12.5 mg per tablet Take 1 tablet by mouth once daily            While in the hospital, will manage blood pressure as follows; Continue home antihypertensive regimen    Will utilize p.r.n. blood pressure medication only if patient's blood pressure greater than 160/100 and she develops symptoms such as worsening chest pain or shortness of breath.    11/15 GT: Will give an additional dose of diuresis today, dc IVF.     Arthropathy          VTE Risk Mitigation (From admission, onward)           Ordered     IP VTE HIGH RISK PATIENT  Once         11/11/23 1611     Place sequential compression device  Until discontinued         11/11/23 1611     Place BREANA hose  Until discontinued         11/11/23 1611                    Discharge Planning   SANDRA:      Code Status: DNR   Is the patient medically ready for discharge?:     Reason for patient still in hospital (select all that apply): Patient trending condition, Treatment, and Pending disposition  Discharge Plan A: New Nursing Home placement - snf care facility, Hospice/home                  WALT VALENCIA  Department of Hospital Medicine   [facility]

## 2023-11-15 NOTE — ASSESSMENT & PLAN NOTE
Patient's FSGs are controlled on current medication regimen.  Last A1c reviewed-   Lab Results   Component Value Date    HGBA1C 4.8 11/11/2023    HGBA1C 4.8 11/11/2023     Most recent fingerstick glucose reviewed-   Recent Labs   Lab 11/14/23  1242 11/14/23  1757   POCTGLUCOSE 201* 241*       Current correctional scale  Low  Maintain anti-hyperglycemic dose as follows-   Antihyperglycemics (From admission, onward)    Start     Stop Route Frequency Ordered    11/11/23 1624  insulin aspart U-100 pen 0-5 Units         -- SubQ Before meals & nightly PRN 11/11/23 1611        Hold Oral hypoglycemics while patient is in the hospital.

## 2023-11-15 NOTE — ASSESSMENT & PLAN NOTE
Patient on hospice services, was brought in to the ED with suspicion of stroke symptoms, was found to have UTI, patient was admitted for IV antibiotic therapy, no leukocytosis, afebrile currently, follow-up urine culture report, continue IV Rocephin.    11/13 GT:  Urine culture demonstrates aerococcus urinae, awaiting sensitivity report.  Continue IV Rocephin.  Awaiting morning labs.    11/14 GT: DC Rocephin, begin Ampicillin.    11/15 GT: Continue Ampicillin.

## 2023-11-15 NOTE — SUBJECTIVE & OBJECTIVE
Past Medical History:   Diagnosis Date    Anemia, unspecified     Anxiety disorder, unspecified     Asthma     Carpal tunnel syndrome     Chills     Constipation     Dementia     Depression     Dermatosis     Diabetes mellitus out of control     Diabetes mellitus type 2, controlled     Diarrhea     Dizziness     Gastroenteritis     Gastroesophageal reflux     GI hemorrhage     Heartburn     HTN (hypertension)     Hypothyroidism, unspecified     Impaction of the bowels     Irritable bowel syndrome     Major depressive disorder, single episode, unspecified     Malaise and fatigue     Melena     Mild intermittent asthma, uncomplicated     Mixed hyperlipidemia     Nausea with vomiting     Osteopenia     Rheumatoid arthritis, unspecified     Rosacea     Sleep apnea, unspecified     Unspecified macular degeneration     Unspecified osteoarthritis, unspecified site     Vitamin D deficiency     Weakness        Past Surgical History:   Procedure Laterality Date    CARPAL TUNNEL RELEASE  2006    lt. hand    CATARACT EXTRACTION  2006    CHOLECYSTECTOMY      COLONOSCOPY  1997    COSMETIC SURGERY      FACE LIFT    HEMORRHOID SURGERY      RHINOPLASTY         Review of patient's allergies indicates:   Allergen Reactions    Sulfamethoxazole-trimethoprim Hives     Other reaction(s): hives       No current facility-administered medications on file prior to encounter.     Current Outpatient Medications on File Prior to Encounter   Medication Sig    cholecalciferol, vitamin D3, (VITAMIN D3) 50 mcg (2,000 unit) Cap capsule 1 capsule.    esomeprazole (NEXIUM) 40 MG capsule TAKE 1 CAPSULE BY MOUTH EVERY DAY BEFORE BREAKFAST.    LANTUS SOLOSTAR U-100 INSULIN glargine 100 units/mL SubQ pen INJECT 60 UNITS SUBCUTANEOUSLY ONCE DAILY    levothyroxine (SYNTHROID) 75 MCG tablet Take 1 tablet by mouth once daily    meclizine (ANTIVERT) 25 mg tablet TAKE 1 TABLET BY MOUTH THREE TIMES DAILY AS NEEDED FOR DIZZINESS    memantine (NAMENDA) 10 MG Tab  "Take 1 tablet by mouth twice daily    metoclopramide HCl (REGLAN) 5 MG tablet TAKE 1 TABLET BY MOUTH 4 TIMES DAILY 30 MINUTES BEFORE  MEALS  AND  AT  BEDTIME    multivit-min/iron/folic/lutein (CENTRUM SILVER WOMEN ORAL) Take by mouth.    pioglitazone (ACTOS) 30 MG tablet Take 1 tablet by mouth once daily    QUEtiapine (SEROQUEL) 100 MG Tab TAKE 1 TABLET BY MOUTH ONCE DAILY AT BEDTIME    simvastatin (ZOCOR) 40 MG tablet TAKE 1 TABLET BY MOUTH ONCE DAILY IN THE EVENING    valsartan-hydrochlorothiazide (DIOVAN-HCT) 80-12.5 mg per tablet Take 1 tablet by mouth once daily    venlafaxine (EFFEXOR-XR) 150 MG Cp24 TAKE 1 CAPSULE BY MOUTH EVERY DAY    vit A/vit C/vit E/zinc/copper (PRESERVISION AREDS ORAL) Take by mouth.    vitamin E 400 UNIT capsule 1 capsule.    BINAXNOW COVID-19 AG SELF TEST Kit Use as Directed on the Package    iron [id number]-Ca-suc-stoma (MULTIGEN) 70 mg-150 mg-10 mcg-2 mg-75 mg Tab tablet TAKE 1 TABLET BY MOUTH EVERY DAY    pen needle, diabetic 32 gauge x 5/32" Ndle 1 applicator by Misc.(Non-Drug; Combo Route) route once daily.     Family History       Problem Relation (Age of Onset)    Coronary artery disease Father, Brother    Diabetes Mellitus Brother, Paternal Grandmother          Tobacco Use    Smoking status: Former     Current packs/day: 1.00     Types: Cigarettes    Smokeless tobacco: Never    Tobacco comments:     started age 18 stopped age 55   Substance and Sexual Activity    Alcohol use: Not Currently    Drug use: Never    Sexual activity: Not on file     Review of Systems   Unable to perform ROS: Dementia     Objective:     Vital Signs (Most Recent):  Temp: 99 °F (37.2 °C) (11/15/23 0517)  Pulse: 84 (11/15/23 0517)  Resp: 20 (11/15/23 0517)  BP: 126/66 (11/15/23 0517)  SpO2: (!) 93 % (11/15/23 0517) Vital Signs (24h Range):  Temp:  [97.7 °F (36.5 °C)-99 °F (37.2 °C)] 99 °F (37.2 °C)  Pulse:  [76-85] 84  Resp:  [17-20] 20  SpO2:  [93 %-97 %] 93 %  BP: (126-194)/(66-84) 126/66     Weight: " 73.9 kg (162 lb 14.4 oz)  Body mass index is 30.78 kg/m².     Physical Exam  Vitals and nursing note reviewed.   Constitutional:       General: She is not in acute distress.     Appearance: She is ill-appearing. She is not diaphoretic.      Comments: Frail, chronically ill-appearing   HENT:      Head: Normocephalic and atraumatic.      Nose: No congestion or rhinorrhea.      Mouth/Throat:      Mouth: Mucous membranes are moist.      Pharynx: Oropharynx is clear. No oropharyngeal exudate or posterior oropharyngeal erythema.   Eyes:      General: No scleral icterus.        Right eye: Discharge present.         Left eye: No discharge.      Extraocular Movements: Extraocular movements intact.      Conjunctiva/sclera:      Right eye: Exudate present.   Cardiovascular:      Rate and Rhythm: Normal rate and regular rhythm.      Pulses: Normal pulses.      Heart sounds: Normal heart sounds. No murmur heard.     No friction rub. No gallop.   Pulmonary:      Effort: Pulmonary effort is normal. No respiratory distress.      Breath sounds: No stridor. No wheezing, rhonchi or rales.   Chest:      Chest wall: No tenderness.   Abdominal:      General: Bowel sounds are normal. There is no distension.      Palpations: Abdomen is soft.      Tenderness: There is no abdominal tenderness. There is no guarding.   Musculoskeletal:         General: Swelling present.      Cervical back: Normal range of motion and neck supple.      Right lower leg: No edema.      Left lower leg: No edema.      Comments: Edema to upper extremities   Skin:     General: Skin is warm and dry.      Capillary Refill: Capillary refill takes less than 2 seconds.      Coloration: Skin is pale.   Neurological:      Mental Status: She is lethargic.      Motor: Weakness (generalized) present.   Psychiatric:         Mood and Affect: Mood normal.         Behavior: Behavior normal.         Thought Content: Thought content normal.                Significant Labs: All  pertinent labs within the past 24 hours have been reviewed.      Recent Lab Results         11/15/23  0508   11/14/23  1757   11/14/23  1242        Albumin 1.9           ALP 83           ALT 22           Anion Gap 5           AST 21           Baso # 0.03           Basophil % 0.3           BILIRUBIN TOTAL 0.4  Comment: For infants and newborns, interpretation of results should be based  on gestational age, weight and in agreement with clinical  observations.    Premature Infant recommended reference ranges:  Up to 24 hours.............<8.0 mg/dL  Up to 48 hours............<12.0 mg/dL  3-5 days..................<15.0 mg/dL  6-29 days.................<15.0 mg/dL    For patients on Eltrombopag therapy, use of Dimension [place] TBIL is   not   recommended.             BUN 11           Calcium 8.0           Chloride 112           CO2 24           Creatinine 0.6           Differential Method Automated           eGFR >60.0           Eos # 0.1           Eosinophil % 1.0           Glucose 208           Gran # (ANC) 8.3           Gran % 86.6           Hematocrit 31.8           Hemoglobin 10.4           Immature Grans (Abs) 0.05  Comment: Mild elevation in immature granulocytes is non specific and   can be seen in a variety of conditions including stress response,   acute inflammation, trauma and pregnancy. Correlation with other   laboratory and clinical findings is essential.             Immature Granulocytes 0.5           Lymph # 0.4           Lymph % 3.9           Magnesium  1.8           MCH 30.4           MCHC 32.7           MCV 93           Mono # 0.7           Mono % 7.7           MPV 10.4           nRBC 0           Platelet Count 195           POCT Glucose   241   201       Potassium 2.8           PROTEIN TOTAL 6.0           RBC 3.42           RDW 14.2           Sodium 141           WBC 9.56                   Significant Imaging: I have reviewed all pertinent imaging results/findings within the past 24 hours.

## 2023-11-16 VITALS
TEMPERATURE: 99 F | SYSTOLIC BLOOD PRESSURE: 132 MMHG | DIASTOLIC BLOOD PRESSURE: 75 MMHG | WEIGHT: 162.88 LBS | RESPIRATION RATE: 20 BRPM | OXYGEN SATURATION: 96 % | HEART RATE: 83 BPM | HEIGHT: 61 IN | BODY MASS INDEX: 30.75 KG/M2

## 2023-11-16 LAB
ALBUMIN SERPL BCP-MCNC: 1.9 G/DL (ref 3.5–5.2)
ALP SERPL-CCNC: 85 U/L (ref 55–135)
ALT SERPL W/O P-5'-P-CCNC: 24 U/L (ref 10–44)
ANION GAP SERPL CALC-SCNC: 7 MMOL/L (ref 3–11)
AST SERPL-CCNC: 23 U/L (ref 10–40)
BACTERIA BLD CULT: NORMAL
BACTERIA BLD CULT: NORMAL
BASOPHILS # BLD AUTO: 0.05 K/UL (ref 0–0.2)
BASOPHILS NFR BLD: 0.7 % (ref 0–1.9)
BILIRUB SERPL-MCNC: 0.5 MG/DL (ref 0.1–1)
BUN SERPL-MCNC: 11 MG/DL (ref 10–30)
CALCIUM SERPL-MCNC: 8.2 MG/DL (ref 8.7–10.5)
CHLORIDE SERPL-SCNC: 111 MMOL/L (ref 95–110)
CO2 SERPL-SCNC: 25 MMOL/L (ref 23–29)
CREAT SERPL-MCNC: 0.6 MG/DL (ref 0.5–1.4)
DIFFERENTIAL METHOD: ABNORMAL
EOSINOPHIL # BLD AUTO: 0.2 K/UL (ref 0–0.5)
EOSINOPHIL NFR BLD: 2.6 % (ref 0–8)
ERYTHROCYTE [DISTWIDTH] IN BLOOD BY AUTOMATED COUNT: 14.2 % (ref 11.5–14.5)
EST. GFR  (NO RACE VARIABLE): >60 ML/MIN/1.73 M^2
GLUCOSE SERPL-MCNC: 178 MG/DL (ref 70–110)
HCT VFR BLD AUTO: 31.1 % (ref 37–48.5)
HGB BLD-MCNC: 10.6 G/DL (ref 12–16)
IMM GRANULOCYTES # BLD AUTO: 0.05 K/UL (ref 0–0.04)
IMM GRANULOCYTES NFR BLD AUTO: 0.7 % (ref 0–0.5)
LYMPHOCYTES # BLD AUTO: 0.6 K/UL (ref 1–4.8)
LYMPHOCYTES NFR BLD: 7.6 % (ref 18–48)
MAGNESIUM SERPL-MCNC: 1.6 MG/DL (ref 1.6–2.6)
MCH RBC QN AUTO: 31.2 PG (ref 27–31)
MCHC RBC AUTO-ENTMCNC: 34.1 G/DL (ref 32–36)
MCV RBC AUTO: 92 FL (ref 82–98)
MONOCYTES # BLD AUTO: 0.6 K/UL (ref 0.3–1)
MONOCYTES NFR BLD: 7.8 % (ref 4–15)
NEUTROPHILS # BLD AUTO: 6.1 K/UL (ref 1.8–7.7)
NEUTROPHILS NFR BLD: 80.6 % (ref 38–73)
NRBC BLD-RTO: 0 /100 WBC
PLATELET # BLD AUTO: 215 K/UL (ref 150–450)
PMV BLD AUTO: 10.8 FL (ref 9.2–12.9)
POTASSIUM SERPL-SCNC: 2.8 MMOL/L (ref 3.5–5.1)
PROT SERPL-MCNC: 6.1 G/DL (ref 6–8.4)
RBC # BLD AUTO: 3.4 M/UL (ref 4–5.4)
SODIUM SERPL-SCNC: 143 MMOL/L (ref 136–145)
WBC # BLD AUTO: 7.59 K/UL (ref 3.9–12.7)

## 2023-11-16 PROCEDURE — 83735 ASSAY OF MAGNESIUM: CPT

## 2023-11-16 PROCEDURE — 63600175 PHARM REV CODE 636 W HCPCS

## 2023-11-16 PROCEDURE — 25000003 PHARM REV CODE 250: Performed by: INTERNAL MEDICINE

## 2023-11-16 PROCEDURE — 80053 COMPREHEN METABOLIC PANEL: CPT

## 2023-11-16 PROCEDURE — 25000003 PHARM REV CODE 250

## 2023-11-16 PROCEDURE — 36415 COLL VENOUS BLD VENIPUNCTURE: CPT

## 2023-11-16 PROCEDURE — 85025 COMPLETE CBC W/AUTO DIFF WBC: CPT

## 2023-11-16 RX ORDER — ACETAMINOPHEN 650 MG/1
650 SUPPOSITORY RECTAL EVERY 6 HOURS PRN
Qty: 10 SUPPOSITORY | Refills: 0 | Status: SHIPPED | OUTPATIENT
Start: 2023-11-16

## 2023-11-16 RX ADMIN — FAMOTIDINE 20 MG: 10 INJECTION, SOLUTION INTRAVENOUS at 09:11

## 2023-11-16 RX ADMIN — AMPICILLIN SODIUM 1 G: 1 INJECTION, POWDER, FOR SOLUTION INTRAMUSCULAR; INTRAVENOUS at 04:11

## 2023-11-16 RX ADMIN — AMPICILLIN SODIUM 1 G: 1 INJECTION, POWDER, FOR SOLUTION INTRAMUSCULAR; INTRAVENOUS at 10:11

## 2023-11-16 RX ADMIN — MOXIFLOXACIN 1 DROP: 5 SOLUTION/ DROPS OPHTHALMIC at 08:11

## 2023-11-16 NOTE — SUBJECTIVE & OBJECTIVE
Past Medical History:   Diagnosis Date    Anemia, unspecified     Anxiety disorder, unspecified     Asthma     Carpal tunnel syndrome     Chills     Constipation     Dementia     Depression     Dermatosis     Diabetes mellitus out of control     Diabetes mellitus type 2, controlled     Diarrhea     Dizziness     Gastroenteritis     Gastroesophageal reflux     GI hemorrhage     Heartburn     HTN (hypertension)     Hypothyroidism, unspecified     Impaction of the bowels     Irritable bowel syndrome     Major depressive disorder, single episode, unspecified     Malaise and fatigue     Melena     Mild intermittent asthma, uncomplicated     Mixed hyperlipidemia     Nausea with vomiting     Osteopenia     Rheumatoid arthritis, unspecified     Rosacea     Sleep apnea, unspecified     Unspecified macular degeneration     Unspecified osteoarthritis, unspecified site     Vitamin D deficiency     Weakness        Past Surgical History:   Procedure Laterality Date    CARPAL TUNNEL RELEASE  2006    lt. hand    CATARACT EXTRACTION  2006    CHOLECYSTECTOMY      COLONOSCOPY  1997    COSMETIC SURGERY      FACE LIFT    HEMORRHOID SURGERY      RHINOPLASTY         Review of patient's allergies indicates:   Allergen Reactions    Sulfamethoxazole-trimethoprim Hives     Other reaction(s): hives       No current facility-administered medications on file prior to encounter.     Current Outpatient Medications on File Prior to Encounter   Medication Sig    cholecalciferol, vitamin D3, (VITAMIN D3) 50 mcg (2,000 unit) Cap capsule 1 capsule.    esomeprazole (NEXIUM) 40 MG capsule TAKE 1 CAPSULE BY MOUTH EVERY DAY BEFORE BREAKFAST.    LANTUS SOLOSTAR U-100 INSULIN glargine 100 units/mL SubQ pen INJECT 60 UNITS SUBCUTANEOUSLY ONCE DAILY    levothyroxine (SYNTHROID) 75 MCG tablet Take 1 tablet by mouth once daily    meclizine (ANTIVERT) 25 mg tablet TAKE 1 TABLET BY MOUTH THREE TIMES DAILY AS NEEDED FOR DIZZINESS    memantine (NAMENDA) 10 MG Tab  "Take 1 tablet by mouth twice daily    metoclopramide HCl (REGLAN) 5 MG tablet TAKE 1 TABLET BY MOUTH 4 TIMES DAILY 30 MINUTES BEFORE  MEALS  AND  AT  BEDTIME    multivit-min/iron/folic/lutein (CENTRUM SILVER WOMEN ORAL) Take by mouth.    pioglitazone (ACTOS) 30 MG tablet Take 1 tablet by mouth once daily    QUEtiapine (SEROQUEL) 100 MG Tab TAKE 1 TABLET BY MOUTH ONCE DAILY AT BEDTIME    simvastatin (ZOCOR) 40 MG tablet TAKE 1 TABLET BY MOUTH ONCE DAILY IN THE EVENING    valsartan-hydrochlorothiazide (DIOVAN-HCT) 80-12.5 mg per tablet Take 1 tablet by mouth once daily    venlafaxine (EFFEXOR-XR) 150 MG Cp24 TAKE 1 CAPSULE BY MOUTH EVERY DAY    vit A/vit C/vit E/zinc/copper (PRESERVISION AREDS ORAL) Take by mouth.    vitamin E 400 UNIT capsule 1 capsule.    BINAXNOW COVID-19 AG SELF TEST Kit Use as Directed on the Package    iron [id number]-Ca-suc-stoma (MULTIGEN) 70 mg-150 mg-10 mcg-2 mg-75 mg Tab tablet TAKE 1 TABLET BY MOUTH EVERY DAY    pen needle, diabetic 32 gauge x 5/32" Ndle 1 applicator by Misc.(Non-Drug; Combo Route) route once daily.     Family History       Problem Relation (Age of Onset)    Coronary artery disease Father, Brother    Diabetes Mellitus Brother, Paternal Grandmother          Tobacco Use    Smoking status: Former     Current packs/day: 1.00     Types: Cigarettes    Smokeless tobacco: Never    Tobacco comments:     started age 18 stopped age 55   Substance and Sexual Activity    Alcohol use: Not Currently    Drug use: Never    Sexual activity: Not on file     Review of Systems   Unable to perform ROS: Dementia     Objective:     Vital Signs (Most Recent):  Temp: 98.8 °F (37.1 °C) (11/16/23 0808)  Pulse: 83 (11/16/23 0734)  Resp: 20 (11/16/23 0734)  BP: 132/75 (11/16/23 0734)  SpO2: 96 % (11/16/23 0734) Vital Signs (24h Range):  Temp:  [98.5 °F (36.9 °C)-99 °F (37.2 °C)] 98.8 °F (37.1 °C)  Pulse:  [83-94] 83  Resp:  [18-20] 20  SpO2:  [31 %-97 %] 96 %  BP: (131-134)/(74-77) 132/75     Weight: " 73.9 kg (162 lb 14.4 oz)  Body mass index is 30.78 kg/m².     Physical Exam  Vitals and nursing note reviewed.   Constitutional:       General: She is not in acute distress.     Appearance: She is ill-appearing. She is not diaphoretic.      Comments: Frail, chronically ill-appearing   HENT:      Head: Normocephalic and atraumatic.      Nose: No congestion or rhinorrhea.      Mouth/Throat:      Mouth: Mucous membranes are moist.      Pharynx: Oropharynx is clear. No oropharyngeal exudate or posterior oropharyngeal erythema.   Eyes:      General: No scleral icterus.        Right eye: Discharge present.         Left eye: No discharge.      Extraocular Movements: Extraocular movements intact.      Conjunctiva/sclera:      Right eye: Exudate present.   Cardiovascular:      Rate and Rhythm: Normal rate and regular rhythm.      Pulses: Normal pulses.      Heart sounds: Normal heart sounds. No murmur heard.     No friction rub. No gallop.   Pulmonary:      Effort: Pulmonary effort is normal. No respiratory distress.      Breath sounds: No stridor. No wheezing, rhonchi or rales.   Chest:      Chest wall: No tenderness.   Abdominal:      General: Bowel sounds are normal. There is no distension.      Palpations: Abdomen is soft.      Tenderness: There is no abdominal tenderness. There is no guarding.   Musculoskeletal:         General: Swelling present.      Cervical back: Normal range of motion and neck supple.      Right lower leg: No edema.      Left lower leg: No edema.      Comments: Edema to upper extremities   Skin:     General: Skin is warm and dry.      Capillary Refill: Capillary refill takes less than 2 seconds.      Coloration: Skin is pale.   Neurological:      Mental Status: She is lethargic.      Motor: Weakness (generalized) present.   Psychiatric:         Mood and Affect: Mood normal.         Behavior: Behavior normal.         Thought Content: Thought content normal.                Significant Labs: All  "pertinent labs within the past 24 hours have been reviewed.  No results for input(s): "COLORU", "APPEARANCEUA", "PHUR", "SPECGRAV", "PROTEINUA", "GLUCUA", "KETONESU", "BILIRUBINUA", "OCCULTUA", "NITRITE", "UROBILINOGEN", "LEUKOCYTESUR", "RBCUA", "WBCUA", "BACTERIA", "SQUAMEPITHEL", "HYALINECASTS" in the last 48 hours.    Invalid input(s): "WRIGHTSUR"    Recent Lab Results         11/16/23  0322   11/15/23  1957   11/15/23  1541        Albumin 1.9           ALP 85           ALT 24           Anion Gap 7           AST 23           Baso # 0.05           Basophil % 0.7           BILIRUBIN TOTAL 0.5  Comment: For infants and newborns, interpretation of results should be based  on gestational age, weight and in agreement with clinical  observations.    Premature Infant recommended reference ranges:  Up to 24 hours.............<8.0 mg/dL  Up to 48 hours............<12.0 mg/dL  3-5 days..................<15.0 mg/dL  6-29 days.................<15.0 mg/dL    For patients on Eltrombopag therapy, use of Dimension [place] TBIL is   not   recommended.             BUN 11           Calcium 8.2           Chloride 111           CO2 25           Creatinine 0.6           Differential Method Automated           eGFR >60.0           Eos # 0.2           Eosinophil % 2.6           Glucose 178           Gran # (ANC) 6.1           Gran % 80.6           Hematocrit 31.1           Hemoglobin 10.6           Immature Grans (Abs) 0.05  Comment: Mild elevation in immature granulocytes is non specific and   can be seen in a variety of conditions including stress response,   acute inflammation, trauma and pregnancy. Correlation with other   laboratory and clinical findings is essential.             Immature Granulocytes 0.7           Lymph # 0.6           Lymph % 7.6           Magnesium  1.6           MCH 31.2           MCHC 34.1           MCV 92           Mono # 0.6           Mono % 7.8           MPV 10.8           nRBC 0           Platelet Count 215   "         POCT Glucose   154   185       Potassium 2.8           PROTEIN TOTAL 6.1           RBC 3.40           RDW 14.2           Sodium 143           WBC 7.59                   Significant Imaging: I have reviewed all pertinent imaging results/findings within the past 24 hours.

## 2023-11-16 NOTE — NURSING
1235   REPORT CALLED TO [facility] AT [facility].  PATIENT TO BE ADMITTED TO THEIR FACILITY WITH [facility] TO MANAGE CARE AND MEDICATIONS.   [facility] ALSO TO ARRANGE TRANSPORTATION VIA [facility] TO TRANSPORT PATIENT TO THEIR FACILITY.   INGA GIRON LPN

## 2023-11-16 NOTE — PROGRESS NOTES
[facility] Medicine  Progress Note    Patient Name: Nan Holman  MRN: [id number]  Patient Class: IP- Inpatient   Admission Date: 11/11/2023  Length of Stay: 5 days  Attending Physician: Joie Reid MD  Primary Care Provider: Joie Reid MD        Subjective:     Principal Problem:Acute cystitis without hematuria        HPI:  Patient 89-year-old female history of GERD, type 2 diabetes, hypothyroidism, dementia, hyperlipidemia patient ED from hospice care after not being arousable, ED physician talked to PCP, patient was found to have UTI, patient was admitted for IV antibiotics, plan to discharge back to hospice once stable    Overview/Hospital Course:  11/13 GT:  Patient is lying in bed this morning, lethargic, minimally responsive to verbal stimuli.  Patient noted with mucopurulent drainage from right eye, this was cleaned during assessment, requested nursing finish cleaning her eye.  Vigamox dropped ordered for treatment.  Patient's urine culture demonstrates aerococcus urinae, awaiting sensitivity report.  Blood cultures are negative to date.  Patient is already on hospice services and will return home with hospice once UTI is treated appropriately.  Labs were not drawn this morning, orders have been entered, awaiting results.    11/14 GT:  Patient remains lethargic but will arouse to tactile stimuli.  No sensitivity to urine culture due to organism having difficulty growing on the media disc.  Our staff pharmacist did speak with Infectious Disease in the [place], antibiotic recommendations made.  We will switch patient's antibiotics to ampicillin today.  We will give a 1 time dose of Lasix due to edema to bilateral upper extremities, lower extremities remain without edema.  Patient remains hypokalemic, we will start oral potassium replacement and switch IV fluids to D5 half-normal saline with KCl 20 mEq at 50 an hour.  Blood cultures remain negative to  date.    11/15 GT:  Patient remains lethargic, will open her eyes for a few seconds, but immediately closes them.  Patient not verbally responsive.  Son at bedside and we have discussed that patient appears to be end-stage with 3rd spacing, no p.o. intake.  Son reports who prefer to continue hospice care at a nursing home in [place] due to this being closer to them.  Case management consult for nursing home placement entered, ppd panel entered, patient had a chest x-ray on admission.  We will give additional dose of diuresis today and discontinue IV fluids.  We will give 20 mEq potassium rider.    11/16 GT:  Patient's status is unchanged from yesterday.  After discussion between  and patient's family, family wanted her to go to an inpatient hospice facility, but she did not meet inpatient hospice criteria.  Patient will instead go to [facility] as a hospice patient with current hospice company.  One hundred forty-two signed by MD, and as soon as it is reviewed and approved patient will be discharged.        Assessment/Plan:      * Acute cystitis without hematuria  Patient on hospice services, was brought in to the ED with suspicion of stroke symptoms, was found to have UTI, patient was admitted for IV antibiotic therapy, no leukocytosis, afebrile currently, follow-up urine culture report, continue IV Rocephin.    11/13 GT:  Urine culture demonstrates aerococcus urinae, awaiting sensitivity report.  Continue IV Rocephin.  Awaiting morning labs.    11/14 GT: DC Rocephin, begin Ampicillin.    11/15 GT: Continue Ampicillin.      Hypokalemia  Patient has hypokalemia which is Acute and currently controlled. Most recent potassium levels reviewed-   Lab Results   Component Value Date    K 2.8 (L) 11/16/2023     Start d5 1/2 NS with Kcl 20 mEq at 50 mL/hr. Monitor with daily labs.     11/15 GT: DC IVF due to worsening third spacing, will give K rider today.     We will not treat hypokalemia any  further.    Acute bacterial conjunctivitis of right eye  Vigamox t.i.d..  Clean eye p.r.n..      Dementia  Continue Namenda    Diabetes mellitus without complication  Patient's FSGs are controlled on current medication regimen.  Last A1c reviewed-   Lab Results   Component Value Date    HGBA1C 4.8 11/11/2023    HGBA1C 4.8 11/11/2023     Most recent fingerstick glucose reviewed-   Recent Labs   Lab 11/15/23  1541 11/15/23  1957   POCTGLUCOSE 185* 154*       Current correctional scale  Low  Maintain anti-hyperglycemic dose as follows-   Antihyperglycemics (From admission, onward)      Start     Stop Route Frequency Ordered    11/11/23 1624  insulin aspart U-100 pen 0-5 Units         -- SubQ Before meals & nightly PRN 11/11/23 1611          Hold Oral hypoglycemics while patient is in the hospital.    Hypothyroidism  Continue levothyroxine 75 mcg daily      Depression, major, recurrent  Continue Effexor and Seroquel        Benign essential hypertension  Chronic, controlled. Latest blood pressure and vitals reviewed-     Temp:  [98.5 °F (36.9 °C)-99 °F (37.2 °C)]   Pulse:  [83-94]   Resp:  [18-20]   BP: (131-134)/(74-77)   SpO2:  [31 %-97 %] .   Home meds for hypertension were reviewed and noted below.   Hypertension Medications               valsartan-hydrochlorothiazide (DIOVAN-HCT) 80-12.5 mg per tablet Take 1 tablet by mouth once daily            While in the hospital, will manage blood pressure as follows; Continue home antihypertensive regimen    Will utilize p.r.n. blood pressure medication only if patient's blood pressure greater than 160/100 and she develops symptoms such as worsening chest pain or shortness of breath.    11/15 GT: Will give an additional dose of diuresis today, dc IVF.     11/16 GT:  Output increased yesterday, patient still remains edematous, IV fluids were discontinued yesterday.    Arthropathy          VTE Risk Mitigation (From admission, onward)           Ordered     IP VTE HIGH RISK  PATIENT  Once         11/11/23 1611     Place sequential compression device  Until discontinued         11/11/23 1611     Place BREANA hose  Until discontinued         11/11/23 1611                    Discharge Planning   SANDRA:      Code Status: DNR   Is the patient medically ready for discharge?:     Reason for patient still in hospital (select all that apply): Patient trending condition and Pending disposition  Discharge Plan A: Inpatient Hospice                  WALT VALENCIA  Department of [facility] Medicine   [facility]

## 2023-11-16 NOTE — DISCHARGE SUMMARY
[facility] [place] Medicine  Discharge Summary      Patient Name: Nan Holman  MRN: [id number]  JULIETTE: [id number]  Patient Class: IP- Inpatient  Admission Date: 11/11/2023  Hospital Length of Stay: 5 days  Discharge Date and Time:  11/16/2023 12:10 PM  Attending Physician: Joie Reid MD   Discharging Provider: WALT VALENCIA  Primary Care Provider: Joie Reid MD    Primary Care Team: Networked reference to record PCT     HPI:   Patient 89-year-old female history of GERD, type 2 diabetes, hypothyroidism, dementia, hyperlipidemia patient ED from hospice care after not being arousable, ED physician talked to PCP, patient was found to have UTI, patient was admitted for IV antibiotics, plan to discharge back to hospice once stable    * No surgery found *      Hospital Course:   11/13 GT:  Patient is lying in bed this morning, lethargic, minimally responsive to verbal stimuli.  Patient noted with mucopurulent drainage from right eye, this was cleaned during assessment, requested nursing finish cleaning her eye.  Vigamox dropped ordered for treatment.  Patient's urine culture demonstrates aerococcus urinae, awaiting sensitivity report.  Blood cultures are negative to date.  Patient is already on hospice services and will return home with hospice once UTI is treated appropriately.  Labs were not drawn this morning, orders have been entered, awaiting results.    11/14 GT:  Patient remains lethargic but will arouse to tactile stimuli.  No sensitivity to urine culture due to organism having difficulty growing on the media disc.  Our staff pharmacist did speak with Infectious Disease in the [place], antibiotic recommendations made.  We will switch patient's antibiotics to ampicillin today.  We will give a 1 time dose of Lasix due to edema to bilateral upper extremities, lower extremities remain without edema.  Patient remains hypokalemic, we will start oral potassium replacement  and switch IV fluids to D5 half-normal saline with KCl 20 mEq at 50 an hour.  Blood cultures remain negative to date.    11/15 GT:  Patient remains lethargic, will open her eyes for a few seconds, but immediately closes them.  Patient not verbally responsive.  Son at bedside and we have discussed that patient appears to be end-stage with 3rd spacing, no p.o. intake.  Son reports who prefer to continue hospice care at a nursing home in [place] due to this being closer to them.  Case management consult for nursing home placement entered, ppd panel entered, patient had a chest x-ray on admission.  We will give additional dose of diuresis today and discontinue IV fluids.  We will give 20 mEq potassium rider.    11/16 GT:  Patient's status is unchanged from yesterday.  After discussion between  and patient's family, family wanted her to go to an inpatient hospice facility, but she did not meet inpatient hospice criteria.  Patient will instead go to [facility] as a hospice patient with current hospice company.  One hundred forty-two signed by MD, and as soon as it is reviewed and approved patient will be discharged.    DC Summary: Approval received. Patient will be discharged to [facility] with hospice services.  Tylenol suppositories sent to [facility], all other medications discontinued due to patient not taking in any oral intake.  Hospice company to provide E kit.     Goals of Care Treatment Preferences:  Code Status: DNR      Consults:   Consults (From admission, onward)          Status Ordering Provider     Inpatient consult to Social Work/Case Management  Once        Provider:  (Not yet assigned)    Completed DOLORES AGUILAR consult to case management  Once        Provider:  (Not yet assigned)    Completed GOVIND OSWALD            Neuro  Dementia  Medications discontinued on discharge due to patient not having any p.o. intake.    Psychiatric  Depression, major,  recurrent  Medications discontinued on discharge due to patient not having any p.o. intake.        Ophtho  Acute bacterial conjunctivitis of right eye  Vigamox t.i.d..  Clean eye p.r.n..      Cardiac/Vascular  Benign essential hypertension  Chronic, controlled. Latest blood pressure and vitals reviewed-     Temp:  [98.5 °F (36.9 °C)-99 °F (37.2 °C)]   Pulse:  [83-94]   Resp:  [18-20]   BP: (131-134)/(74-77)   SpO2:  [31 %-97 %] .   Home meds for hypertension were reviewed and noted below.   Hypertension Medications               valsartan-hydrochlorothiazide (DIOVAN-HCT) 80-12.5 mg per tablet Take 1 tablet by mouth once daily            While in the hospital, will manage blood pressure as follows; Continue home antihypertensive regimen    Will utilize p.r.n. blood pressure medication only if patient's blood pressure greater than 160/100 and she develops symptoms such as worsening chest pain or shortness of breath.    11/15 GT: Will give an additional dose of diuresis today, dc IVF.     11/16 GT:  Output increased yesterday, patient still remains edematous, IV fluids were discontinued yesterday.    Renal/  * Acute cystitis without hematuria  Patient on hospice services, was brought in to the ED with suspicion of stroke symptoms, was found to have UTI, patient was admitted for IV antibiotic therapy, no leukocytosis, afebrile currently, follow-up urine culture report, continue IV Rocephin.    11/13 GT:  Urine culture demonstrates aerococcus urinae, awaiting sensitivity report.  Continue IV Rocephin.  Awaiting morning labs.    11/14 GT: DC Rocephin, begin Ampicillin.    11/15 GT: Continue Ampicillin.    11/16 GT:  DC ampicillin on discharge.  Hospice care.      Hypokalemia  Patient has hypokalemia which is Acute and currently controlled. Most recent potassium levels reviewed-   Lab Results   Component Value Date    K 2.8 (L) 11/16/2023     Start d5 1/2 NS with Kcl 20 mEq at 50 mL/hr. Monitor with daily labs.     11/15  GT: DC IVF due to worsening third spacing, will give K rider today.     We will not treat hypokalemia any further.    Endocrine  Diabetes mellitus without complication  Patient's FSGs are controlled on current medication regimen.  Last A1c reviewed-   Lab Results   Component Value Date    HGBA1C 4.8 11/11/2023    HGBA1C 4.8 11/11/2023     Most recent fingerstick glucose reviewed-   Recent Labs   Lab 11/15/23  1541 11/15/23  1957   POCTGLUCOSE 185* 154*       Current correctional scale  Low  Maintain anti-hyperglycemic dose as follows-   Antihyperglycemics (From admission, onward)      Start     Stop Route Frequency Ordered    11/11/23 1624  insulin aspart U-100 pen 0-5 Units         -- SubQ Before meals & nightly PRN 11/11/23 1611          Hold Oral hypoglycemics while patient is in the hospital.    Hypothyroidism  Medications discontinued on discharge due to patient not having any p.o. intake.        Final Active Diagnoses:    Diagnosis Date Noted POA    PRINCIPAL PROBLEM:  Acute cystitis without hematuria [N30.00] 11/12/2023 Yes    Hypokalemia [E87.6] 11/14/2023 Yes    Acute bacterial conjunctivitis of right eye [H10.31] 11/13/2023 Yes    Dementia [F03.90]  Yes    Benign essential hypertension [I10] 04/19/2022 Yes    Diabetes mellitus without complication [E11.9] 04/19/2022 Yes    Depression, major, recurrent [F33.9] 04/19/2022 Yes    Hypothyroidism [E03.9] 04/19/2022 Yes      Problems Resolved During this Admission:       Discharged Condition:  Hospice services/poor    Disposition: Hospice/Home    Follow Up:   Contact information for after-discharge care       Destination       [facility] .    Service: Nursing Home  Contact information:  [address] [id number] [phone]                                 Patient Instructions:      Diet Dysphagia Pureed   Order Comments: Only if patient is awake and alert, NPO otherwise     Activity as tolerated       Significant  "Diagnostic Studies: N/A    Pending Diagnostic Studies:       None           Medications:  Reconciled Home Medications:      Medication List        START taking these medications      acetaminophen 650 MG Supp  Commonly known as: TYLENOL  Place 1 suppository (650 mg total) rectally every 6 (six) hours as needed (fever greater than 100.4).            STOP taking these medications      BINAXNOW COVID-19 AG SELF TEST Kit  Generic drug: COVID-19 antigen test     CENTRUM SILVER WOMEN ORAL     cholecalciferol (vitamin D3) 50 mcg (2,000 unit) Cap capsule  Commonly known as: VITAMIN D3     esomeprazole 40 MG capsule  Commonly known as: NEXIUM     LANTUS SOLOSTAR U-100 INSULIN glargine 100 units/mL SubQ pen  Generic drug: insulin     levothyroxine 75 MCG tablet  Commonly known as: SYNTHROID     meclizine 25 mg tablet  Commonly known as: ANTIVERT     memantine 10 MG Tab  Commonly known as: NAMENDA     metoclopramide HCl 5 MG tablet  Commonly known as: REGLAN     MULTIGEN 70 mg-150 mg-10 mcg-2 mg-75 mg Tab tablet  Generic drug: iron [id number]     pen needle, diabetic 32 gauge x 5/32" Ndle     pioglitazone 30 MG tablet  Commonly known as: ACTOS     PRESERVISION AREDS ORAL     QUEtiapine 100 MG Tab  Commonly known as: SEROQUEL     simvastatin 40 MG tablet  Commonly known as: ZOCOR     valsartan-hydrochlorothiazide 80-12.5 mg per tablet  Commonly known as: DIOVAN-HCT     venlafaxine 150 MG Cp24  Commonly known as: EFFEXOR-XR     vitamin E 400 UNIT capsule              Indwelling Lines/Drains at time of discharge:   Lines/Drains/Airways       None                   Time spent on the discharge of patient: 40 minutes         WALT VALENCIA  Department of Hospital Medicine  [facility]  "

## 2023-11-16 NOTE — PLAN OF CARE
Pt is free of falls and injury. NADN, VSS. Pt tolerating medications well. Pt afebrile during shift. Patient's plan of care reviewed with family member.

## 2023-11-16 NOTE — ASSESSMENT & PLAN NOTE
Patient's FSGs are controlled on current medication regimen.  Last A1c reviewed-   Lab Results   Component Value Date    HGBA1C 4.8 11/11/2023    HGBA1C 4.8 11/11/2023     Most recent fingerstick glucose reviewed-   Recent Labs   Lab 11/15/23  1541 11/15/23  1957   POCTGLUCOSE 185* 154*       Current correctional scale  Low  Maintain anti-hyperglycemic dose as follows-   Antihyperglycemics (From admission, onward)    Start     Stop Route Frequency Ordered    11/11/23 1624  insulin aspart U-100 pen 0-5 Units         -- SubQ Before meals & nightly PRN 11/11/23 1611        Hold Oral hypoglycemics while patient is in the hospital.

## 2023-11-16 NOTE — ASSESSMENT & PLAN NOTE
Patient on hospice services, was brought in to the ED with suspicion of stroke symptoms, was found to have UTI, patient was admitted for IV antibiotic therapy, no leukocytosis, afebrile currently, follow-up urine culture report, continue IV Rocephin.    11/13 GT:  Urine culture demonstrates aerococcus urinae, awaiting sensitivity report.  Continue IV Rocephin.  Awaiting morning labs.    11/14 GT: DC Rocephin, begin Ampicillin.    11/15 GT: Continue Ampicillin.    11/16 GT:  DC ampicillin on discharge.  Hospice care.

## 2023-11-16 NOTE — ASSESSMENT & PLAN NOTE
Patient has hypokalemia which is Acute and currently controlled. Most recent potassium levels reviewed-   Lab Results   Component Value Date    K 2.8 (L) 11/16/2023     Start d5 1/2 NS with Kcl 20 mEq at 50 mL/hr. Monitor with daily labs.     11/15 GT: DC IVF due to worsening third spacing, will give K rider today.     We will not treat hypokalemia any further.

## 2023-11-16 NOTE — ASSESSMENT & PLAN NOTE
Chronic, controlled. Latest blood pressure and vitals reviewed-     Temp:  [98.5 °F (36.9 °C)-99 °F (37.2 °C)]   Pulse:  [83-94]   Resp:  [18-20]   BP: (131-134)/(74-77)   SpO2:  [31 %-97 %] .   Home meds for hypertension were reviewed and noted below.   Hypertension Medications               valsartan-hydrochlorothiazide (DIOVAN-HCT) 80-12.5 mg per tablet Take 1 tablet by mouth once daily            While in the hospital, will manage blood pressure as follows; Continue home antihypertensive regimen    Will utilize p.r.n. blood pressure medication only if patient's blood pressure greater than 160/100 and she develops symptoms such as worsening chest pain or shortness of breath.    11/15 GT: Will give an additional dose of diuresis today, dc IVF.     11/16 GT:  Output increased yesterday, patient still remains edematous, IV fluids were discontinued yesterday.

## 2024-01-22 PROBLEM — Z00.00 WELLNESS EXAMINATION: Status: RESOLVED | Noted: 2022-04-19 | Resolved: 2024-01-22

## 2024-10-21 ENCOUNTER — PATIENT MESSAGE (OUTPATIENT)
Dept: FAMILY MEDICINE | Facility: CLINIC | Age: 89
End: 2024-10-21
Payer: MEDICARE